# Patient Record
Sex: MALE | Race: WHITE | NOT HISPANIC OR LATINO | URBAN - METROPOLITAN AREA
[De-identification: names, ages, dates, MRNs, and addresses within clinical notes are randomized per-mention and may not be internally consistent; named-entity substitution may affect disease eponyms.]

---

## 2019-06-10 ENCOUNTER — EMERGENCY (EMERGENCY)
Facility: HOSPITAL | Age: 69
LOS: 0 days | Discharge: HOME | End: 2019-06-10
Attending: EMERGENCY MEDICINE | Admitting: EMERGENCY MEDICINE
Payer: MEDICARE

## 2019-06-10 VITALS
TEMPERATURE: 97 F | DIASTOLIC BLOOD PRESSURE: 60 MMHG | RESPIRATION RATE: 20 BRPM | HEART RATE: 60 BPM | OXYGEN SATURATION: 99 % | SYSTOLIC BLOOD PRESSURE: 133 MMHG
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
## 2019-06-10 DIAGNOSIS — S89.91XA UNSPECIFIED INJURY OF RIGHT LOWER LEG, INITIAL ENCOUNTER: ICD-10-CM
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
## 2019-06-10 DIAGNOSIS — M25.561 PAIN IN RIGHT KNEE: ICD-10-CM
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
## 2019-06-10 PROCEDURE — 99283 EMERGENCY DEPT VISIT LOW MDM: CPT

## 2019-06-10 PROCEDURE — 73562 X-RAY EXAM OF KNEE 3: CPT | Mod: 26,RT

## 2019-06-10 RX ORDER — IBUPROFEN 200 MG
600 TABLET ORAL ONCE
Refills: 0 | Status: COMPLETED | OUTPATIENT
Start: 2019-06-10 | End: 2019-06-10

## 2019-06-10 RX ORDER — IBUPROFEN 200 MG
1 TABLET ORAL
Qty: 28 | Refills: 0
Start: 2019-06-10 | End: 2019-06-16

## 2019-06-10 RX ORDER — IBUPROFEN 200 MG
400 TABLET ORAL ONCE
Refills: 0 | Status: DISCONTINUED | OUTPATIENT
Start: 2019-06-10 | End: 2019-06-10

## 2019-06-10 RX ADMIN — Medication 600 MILLIGRAM(S): at 14:52

## 2019-06-10 NOTE — ED PROVIDER NOTE - NS ED ROS FT
Eyes:  No visual changes, eye pain or discharge.  ENMT:  No hearing changes, pain, no sore throat or runny nose, no difficulty swallowing  Cardiac:  No chest pain, SOB or edema. No chest pain with exertion.  Respiratory:  No cough or respiratory distress. No hemoptysis. No history of asthma or RAD.  GI:  No nausea, vomiting, diarrhea or abdominal pain.  :  No dysuria, frequency or burning.  MS:  +Medial distal femur tenderness, Otherwise No myalgia, muscle weakness, joint pain or back pain.  Neuro:  No headache or weakness.  No LOC.  Skin:  No skin rash.   Endocrine: No history of thyroid disease or diabetes.

## 2019-06-10 NOTE — ED PROVIDER NOTE - OBJECTIVE STATEMENT
68 y.o. M with no significant PMH with right sided knee injury s/p mechanical slip and fall 3 hours ago. PT stepped over and fell on his buttock, he denies any weakness numbness tingling, no loss of consciousness, not on blood thinners, Pt able to walk at home without a walker.

## 2019-06-10 NOTE — ED PROVIDER NOTE - CARE PROVIDER_API CALL
Amado Galvez (MD)  Orthopaedic Surgery  [address] [place] [address]  Phone: [phone]  Fax: [phone]  Follow Up Time: 1-3 Days

## 2019-06-10 NOTE — ED PROVIDER NOTE - ATTENDING CONTRIBUTION TO CARE
Pt is a 69yo mle who jumpted down 3 steps and landed on his feet but felt a crack in his R knee.  Can walk but reports pain when trying to bend the knee.  No radiation of pain.    Exam: ambulatory, no hip or ankle tenderness, mild tenderness over top of knee, no effusion  Imp: r/o fx  Plan: xr, strapping - ortho f/u

## 2019-06-10 NOTE — ED ADULT NURSE NOTE - NSIMPLEMENTINTERV_GEN_ALL_ED
Implemented All Fall Risk Interventions:  [place] to call system. Call bell, personal items and telephone within reach. Instruct patient to call for assistance. Room bathroom lighting operational. Non-slip footwear when patient is off stretcher. Physically safe environment: no spills, clutter or unnecessary equipment. Stretcher in lowest position, wheels locked, appropriate side rails in place. Provide visual cue, wrist band, yellow gown, etc. Monitor gait and stability. Monitor for mental status changes and reorient to person, place, and time. Review medications for side effects contributing to fall risk. Reinforce activity limits and safety measures with patient and family.

## 2019-06-10 NOTE — ED PROVIDER NOTE - PHYSICAL EXAMINATION
CONSTITUTIONAL: Well-developed; well-nourished; in no acute distress.   SKIN: warm, dry  HEAD: Normocephalic; atraumatic.  EXT: +decreased ROM of right knee, + medial distal femur tenderness on right side, Otherwise Normal ROM.  No clubbing, cyanosis or edema.   NEURO: Alert, oriented, grossly unremarkable  PSYCH: Cooperative, appropriate.

## 2019-09-20 ENCOUNTER — APPOINTMENT (OUTPATIENT)
Dept: CARDIOLOGY | Facility: CLINIC | Age: 69
End: 2019-09-20
Payer: MEDICARE

## 2019-09-20 PROCEDURE — 99214 OFFICE O/P EST MOD 30 MIN: CPT

## 2019-09-20 PROCEDURE — 93000 ELECTROCARDIOGRAM COMPLETE: CPT

## 2019-12-24 ENCOUNTER — INPATIENT (INPATIENT)
Facility: HOSPITAL | Age: 69
LOS: 0 days | Discharge: HOME | End: 2019-12-25
Attending: INTERNAL MEDICINE | Admitting: INTERNAL MEDICINE
Payer: MEDICARE

## 2019-12-24 VITALS
HEART RATE: 90 BPM | DIASTOLIC BLOOD PRESSURE: 81 MMHG | OXYGEN SATURATION: 94 % | SYSTOLIC BLOOD PRESSURE: 197 MMHG | RESPIRATION RATE: 18 BRPM | TEMPERATURE: 102 F

## 2019-12-24 LAB
APTT BLD: 29.3 SEC — SIGNIFICANT CHANGE UP (ref 27–39.2)
BASE EXCESS BLDV CALC-SCNC: -1.6 MMOL/L — SIGNIFICANT CHANGE UP (ref -2–2)
BASOPHILS # BLD AUTO: 0.03 K/UL — SIGNIFICANT CHANGE UP (ref 0–0.2)
BASOPHILS NFR BLD AUTO: 0.2 % — SIGNIFICANT CHANGE UP (ref 0–1)
CA-I SERPL-SCNC: 1.21 MMOL/L — SIGNIFICANT CHANGE UP (ref 1.12–1.3)
EOSINOPHIL # BLD AUTO: 0.11 K/UL — SIGNIFICANT CHANGE UP (ref 0–0.7)
EOSINOPHIL NFR BLD AUTO: 0.9 % — SIGNIFICANT CHANGE UP (ref 0–8)
GAS PNL BLDV: 141 MMOL/L — SIGNIFICANT CHANGE UP (ref 136–145)
GAS PNL BLDV: SIGNIFICANT CHANGE UP
HCO3 BLDV-SCNC: 23 MMOL/L — SIGNIFICANT CHANGE UP (ref 22–29)
HCT VFR BLD CALC: 31.1 % — LOW (ref 42–52)
HCT VFR BLDA CALC: 32.5 % — LOW (ref 34–44)
HGB BLD CALC-MCNC: 10.6 G/DL — LOW (ref 14–18)
HGB BLD-MCNC: 10.3 G/DL — LOW (ref 14–18)
IMM GRANULOCYTES NFR BLD AUTO: 0.4 % — HIGH (ref 0.1–0.3)
INR BLD: 1.09 RATIO — SIGNIFICANT CHANGE UP (ref 0.65–1.3)
LACTATE BLDV-MCNC: 1 MMOL/L — SIGNIFICANT CHANGE UP (ref 0.5–1.6)
LYMPHOCYTES # BLD AUTO: 0.59 K/UL — LOW (ref 1.2–3.4)
LYMPHOCYTES # BLD AUTO: 4.6 % — LOW (ref 20.5–51.1)
MCHC RBC-ENTMCNC: 29.4 PG — SIGNIFICANT CHANGE UP (ref 27–31)
MCHC RBC-ENTMCNC: 33.1 G/DL — SIGNIFICANT CHANGE UP (ref 32–37)
MCV RBC AUTO: 88.9 FL — SIGNIFICANT CHANGE UP (ref 80–94)
MONOCYTES # BLD AUTO: 1.06 K/UL — HIGH (ref 0.1–0.6)
MONOCYTES NFR BLD AUTO: 8.2 % — SIGNIFICANT CHANGE UP (ref 1.7–9.3)
NEUTROPHILS # BLD AUTO: 11.04 K/UL — HIGH (ref 1.4–6.5)
NEUTROPHILS NFR BLD AUTO: 85.7 % — HIGH (ref 42.2–75.2)
NRBC # BLD: 0 /100 WBCS — SIGNIFICANT CHANGE UP (ref 0–0)
PCO2 BLDV: 39 MMHG — LOW (ref 41–51)
PH BLDV: 7.38 — SIGNIFICANT CHANGE UP (ref 7.26–7.43)
PLATELET # BLD AUTO: 216 K/UL — SIGNIFICANT CHANGE UP (ref 130–400)
PO2 BLDV: 30 MMHG — SIGNIFICANT CHANGE UP (ref 20–40)
POTASSIUM BLDV-SCNC: 4.8 MMOL/L — SIGNIFICANT CHANGE UP (ref 3.3–5.6)
PROTHROM AB SERPL-ACNC: 12.5 SEC — SIGNIFICANT CHANGE UP (ref 9.95–12.87)
RBC # BLD: 3.5 M/UL — LOW (ref 4.7–6.1)
RBC # FLD: 11.7 % — SIGNIFICANT CHANGE UP (ref 11.5–14.5)
SAO2 % BLDV: 57 % — SIGNIFICANT CHANGE UP
WBC # BLD: 12.88 K/UL — HIGH (ref 4.8–10.8)
WBC # FLD AUTO: 12.88 K/UL — HIGH (ref 4.8–10.8)

## 2019-12-24 PROCEDURE — 99053 MED SERV 10PM-8AM 24 HR FAC: CPT

## 2019-12-24 PROCEDURE — 71045 X-RAY EXAM CHEST 1 VIEW: CPT | Mod: 26

## 2019-12-24 PROCEDURE — 99285 EMERGENCY DEPT VISIT HI MDM: CPT

## 2019-12-24 PROCEDURE — 93010 ELECTROCARDIOGRAM REPORT: CPT

## 2019-12-24 RX ORDER — ONDANSETRON 8 MG/1
4 TABLET, FILM COATED ORAL ONCE
Refills: 0 | Status: COMPLETED | OUTPATIENT
Start: 2019-12-24 | End: 2019-12-24

## 2019-12-24 RX ORDER — ACETAMINOPHEN 500 MG
975 TABLET ORAL ONCE
Refills: 0 | Status: COMPLETED | OUTPATIENT
Start: 2019-12-24 | End: 2019-12-24

## 2019-12-24 RX ORDER — SODIUM CHLORIDE 9 MG/ML
1000 INJECTION INTRAMUSCULAR; INTRAVENOUS; SUBCUTANEOUS ONCE
Refills: 0 | Status: COMPLETED | OUTPATIENT
Start: 2019-12-24 | End: 2019-12-24

## 2019-12-24 RX ADMIN — SODIUM CHLORIDE 1000 MILLILITER(S): 9 INJECTION INTRAMUSCULAR; INTRAVENOUS; SUBCUTANEOUS at 23:19

## 2019-12-24 RX ADMIN — ONDANSETRON 4 MILLIGRAM(S): 8 TABLET, FILM COATED ORAL at 23:19

## 2019-12-24 RX ADMIN — SODIUM CHLORIDE 1000 MILLILITER(S): 9 INJECTION INTRAMUSCULAR; INTRAVENOUS; SUBCUTANEOUS at 23:49

## 2019-12-24 RX ADMIN — Medication 975 MILLIGRAM(S): at 23:19

## 2019-12-24 NOTE — ED ADULT NURSE NOTE - OBJECTIVE STATEMENT
Patient is a 68 yo male c/o nausea, vomiting and chills started this afternoon, denies abd pain, diarrhea, urinary symptoms, blood in stool.

## 2019-12-24 NOTE — ED ADULT NURSE NOTE - NSIMPLEMENTINTERV_GEN_ALL_ED
Implemented All Universal Safety Interventions:  [place] to call system. Call bell, personal items and telephone within reach. Instruct patient to call for assistance. Room bathroom lighting operational. Non-slip footwear when patient is off stretcher. Physically safe environment: no spills, clutter or unnecessary equipment. Stretcher in lowest position, wheels locked, appropriate side rails in place.

## 2019-12-24 NOTE — ED PROVIDER NOTE - CARE PLAN
Principal Discharge DX:	Fever  Secondary Diagnosis:	Enteritis  Secondary Diagnosis:	Anemia  Secondary Diagnosis:	LAURA (acute kidney injury)  Secondary Diagnosis:	CKD (chronic kidney disease)

## 2019-12-24 NOTE — ED PROVIDER NOTE - OBJECTIVE STATEMENT
Patient is BIB family for evaluation of fever, nausea, chills x one day, no travel, had sick contacts one week ago with family members who had flu like illness. Denies blood in the stool, denies diarrhea, denies nasal congestion, no cough, no rash, no HA/neck pain/back pain, no  symptoms.

## 2019-12-24 NOTE — ED PROVIDER NOTE - CLINICAL SUMMARY MEDICAL DECISION MAKING FREE TEXT BOX
Patient with fever, enteritis, leukocytosis, LAURA on CKD, IVF and IV abx given, is admitted to Medicine for further care.

## 2019-12-25 ENCOUNTER — TRANSCRIPTION ENCOUNTER (OUTPATIENT)
Age: 69
End: 2019-12-25

## 2019-12-25 VITALS
DIASTOLIC BLOOD PRESSURE: 72 MMHG | RESPIRATION RATE: 18 BRPM | SYSTOLIC BLOOD PRESSURE: 172 MMHG | HEART RATE: 63 BPM | OXYGEN SATURATION: 97 % | TEMPERATURE: 99 F

## 2019-12-25 DIAGNOSIS — Z90.49 ACQUIRED ABSENCE OF OTHER SPECIFIED PARTS OF DIGESTIVE TRACT: Chronic | ICD-10-CM

## 2019-12-25 DIAGNOSIS — Z95.1 PRESENCE OF AORTOCORONARY BYPASS GRAFT: Chronic | ICD-10-CM

## 2019-12-25 LAB
ALBUMIN SERPL ELPH-MCNC: 4.2 G/DL — SIGNIFICANT CHANGE UP (ref 3.5–5.2)
ALP SERPL-CCNC: 83 U/L — SIGNIFICANT CHANGE UP (ref 30–115)
ALT FLD-CCNC: 18 U/L — SIGNIFICANT CHANGE UP (ref 0–41)
ANION GAP SERPL CALC-SCNC: 14 MMOL/L — SIGNIFICANT CHANGE UP (ref 7–14)
ANION GAP SERPL CALC-SCNC: 16 MMOL/L — HIGH (ref 7–14)
APPEARANCE UR: CLEAR — SIGNIFICANT CHANGE UP
AST SERPL-CCNC: 24 U/L — SIGNIFICANT CHANGE UP (ref 0–41)
BACTERIA # UR AUTO: NEGATIVE — SIGNIFICANT CHANGE UP
BASOPHILS # BLD AUTO: 0.03 K/UL — SIGNIFICANT CHANGE UP (ref 0–0.2)
BASOPHILS NFR BLD AUTO: 0.3 % — SIGNIFICANT CHANGE UP (ref 0–1)
BILIRUB DIRECT SERPL-MCNC: <0.2 MG/DL — SIGNIFICANT CHANGE UP (ref 0–0.2)
BILIRUB INDIRECT FLD-MCNC: >0.1 MG/DL — LOW (ref 0.2–1.2)
BILIRUB SERPL-MCNC: 0.3 MG/DL — SIGNIFICANT CHANGE UP (ref 0.2–1.2)
BILIRUB UR-MCNC: NEGATIVE — SIGNIFICANT CHANGE UP
BUN SERPL-MCNC: 44 MG/DL — HIGH (ref 10–20)
BUN SERPL-MCNC: 44 MG/DL — HIGH (ref 10–20)
CALCIUM SERPL-MCNC: 9 MG/DL — SIGNIFICANT CHANGE UP (ref 8.5–10.1)
CALCIUM SERPL-MCNC: 9.2 MG/DL — SIGNIFICANT CHANGE UP (ref 8.5–10.1)
CHLORIDE SERPL-SCNC: 103 MMOL/L — SIGNIFICANT CHANGE UP (ref 98–110)
CHLORIDE SERPL-SCNC: 108 MMOL/L — SIGNIFICANT CHANGE UP (ref 98–110)
CO2 SERPL-SCNC: 19 MMOL/L — SIGNIFICANT CHANGE UP (ref 17–32)
CO2 SERPL-SCNC: 19 MMOL/L — SIGNIFICANT CHANGE UP (ref 17–32)
COLOR SPEC: SIGNIFICANT CHANGE UP
CREAT SERPL-MCNC: 1.8 MG/DL — HIGH (ref 0.7–1.5)
CREAT SERPL-MCNC: 1.9 MG/DL — HIGH (ref 0.7–1.5)
DIFF PNL FLD: SIGNIFICANT CHANGE UP
EOSINOPHIL # BLD AUTO: 0.03 K/UL — SIGNIFICANT CHANGE UP (ref 0–0.7)
EOSINOPHIL NFR BLD AUTO: 0.3 % — SIGNIFICANT CHANGE UP (ref 0–8)
EPI CELLS # UR: 1 /HPF — SIGNIFICANT CHANGE UP (ref 0–5)
ESTIMATED AVERAGE GLUCOSE: 183 MG/DL — HIGH (ref 68–114)
FLU A RESULT: NEGATIVE — SIGNIFICANT CHANGE UP
FLU A RESULT: NEGATIVE — SIGNIFICANT CHANGE UP
FLUAV AG NPH QL: NEGATIVE — SIGNIFICANT CHANGE UP
FLUBV AG NPH QL: NEGATIVE — SIGNIFICANT CHANGE UP
GLUCOSE BLDC GLUCOMTR-MCNC: 189 MG/DL — HIGH (ref 70–99)
GLUCOSE SERPL-MCNC: 212 MG/DL — HIGH (ref 70–99)
GLUCOSE SERPL-MCNC: 227 MG/DL — HIGH (ref 70–99)
GLUCOSE UR QL: NEGATIVE — SIGNIFICANT CHANGE UP
HBA1C BLD-MCNC: 8 % — HIGH (ref 4–5.6)
HCT VFR BLD CALC: 30.3 % — LOW (ref 42–52)
HGB BLD-MCNC: 9.5 G/DL — LOW (ref 14–18)
HYALINE CASTS # UR AUTO: 1 /LPF — SIGNIFICANT CHANGE UP (ref 0–7)
IMM GRANULOCYTES NFR BLD AUTO: 0.4 % — HIGH (ref 0.1–0.3)
KETONES UR-MCNC: NEGATIVE — SIGNIFICANT CHANGE UP
LACTATE SERPL-SCNC: 1.1 MMOL/L — SIGNIFICANT CHANGE UP (ref 0.7–2)
LEUKOCYTE ESTERASE UR-ACNC: NEGATIVE — SIGNIFICANT CHANGE UP
LIDOCAIN IGE QN: 43 U/L — SIGNIFICANT CHANGE UP (ref 7–60)
LYMPHOCYTES # BLD AUTO: 1.01 K/UL — LOW (ref 1.2–3.4)
LYMPHOCYTES # BLD AUTO: 9 % — LOW (ref 20.5–51.1)
MAGNESIUM SERPL-MCNC: 2.3 MG/DL — SIGNIFICANT CHANGE UP (ref 1.8–2.4)
MCHC RBC-ENTMCNC: 28.5 PG — SIGNIFICANT CHANGE UP (ref 27–31)
MCHC RBC-ENTMCNC: 31.4 G/DL — LOW (ref 32–37)
MCV RBC AUTO: 91 FL — SIGNIFICANT CHANGE UP (ref 80–94)
MONOCYTES # BLD AUTO: 0.94 K/UL — HIGH (ref 0.1–0.6)
MONOCYTES NFR BLD AUTO: 8.4 % — SIGNIFICANT CHANGE UP (ref 1.7–9.3)
NEUTROPHILS # BLD AUTO: 9.19 K/UL — HIGH (ref 1.4–6.5)
NEUTROPHILS NFR BLD AUTO: 81.6 % — HIGH (ref 42.2–75.2)
NITRITE UR-MCNC: NEGATIVE — SIGNIFICANT CHANGE UP
NRBC # BLD: 0 /100 WBCS — SIGNIFICANT CHANGE UP (ref 0–0)
PH UR: 6 — SIGNIFICANT CHANGE UP (ref 5–8)
PLATELET # BLD AUTO: 205 K/UL — SIGNIFICANT CHANGE UP (ref 130–400)
POTASSIUM SERPL-MCNC: 4.8 MMOL/L — SIGNIFICANT CHANGE UP (ref 3.5–5)
POTASSIUM SERPL-MCNC: 4.9 MMOL/L — SIGNIFICANT CHANGE UP (ref 3.5–5)
POTASSIUM SERPL-SCNC: 4.8 MMOL/L — SIGNIFICANT CHANGE UP (ref 3.5–5)
POTASSIUM SERPL-SCNC: 4.9 MMOL/L — SIGNIFICANT CHANGE UP (ref 3.5–5)
PROT SERPL-MCNC: 7.1 G/DL — SIGNIFICANT CHANGE UP (ref 6–8)
PROT UR-MCNC: ABNORMAL
RAPID RVP RESULT: SIGNIFICANT CHANGE UP
RBC # BLD: 3.33 M/UL — LOW (ref 4.7–6.1)
RBC # FLD: 11.8 % — SIGNIFICANT CHANGE UP (ref 11.5–14.5)
RBC CASTS # UR COMP ASSIST: 1 /HPF — SIGNIFICANT CHANGE UP (ref 0–4)
RSV RESULT: NEGATIVE — SIGNIFICANT CHANGE UP
RSV RNA RESP QL NAA+PROBE: NEGATIVE — SIGNIFICANT CHANGE UP
SODIUM SERPL-SCNC: 138 MMOL/L — SIGNIFICANT CHANGE UP (ref 135–146)
SODIUM SERPL-SCNC: 141 MMOL/L — SIGNIFICANT CHANGE UP (ref 135–146)
SP GR SPEC: 1.02 — SIGNIFICANT CHANGE UP (ref 1.01–1.02)
TROPONIN T SERPL-MCNC: <0.01 NG/ML — SIGNIFICANT CHANGE UP
UROBILINOGEN FLD QL: SIGNIFICANT CHANGE UP
WBC # BLD: 11.24 K/UL — HIGH (ref 4.8–10.8)
WBC # FLD AUTO: 11.24 K/UL — HIGH (ref 4.8–10.8)
WBC UR QL: 1 /HPF — SIGNIFICANT CHANGE UP (ref 0–5)

## 2019-12-25 PROCEDURE — 74176 CT ABD & PELVIS W/O CONTRAST: CPT | Mod: 26

## 2019-12-25 PROCEDURE — 99223 1ST HOSP IP/OBS HIGH 75: CPT

## 2019-12-25 RX ORDER — ONDANSETRON 8 MG/1
4 TABLET, FILM COATED ORAL EVERY 8 HOURS
Refills: 0 | Status: DISCONTINUED | OUTPATIENT
Start: 2019-12-25 | End: 2019-12-25

## 2019-12-25 RX ORDER — DEXTROSE 50 % IN WATER 50 %
25 SYRINGE (ML) INTRAVENOUS ONCE
Refills: 0 | Status: DISCONTINUED | OUTPATIENT
Start: 2019-12-25 | End: 2019-12-25

## 2019-12-25 RX ORDER — DEXTROSE 50 % IN WATER 50 %
12.5 SYRINGE (ML) INTRAVENOUS ONCE
Refills: 0 | Status: DISCONTINUED | OUTPATIENT
Start: 2019-12-25 | End: 2019-12-25

## 2019-12-25 RX ORDER — SIMVASTATIN 20 MG/1
40 TABLET, FILM COATED ORAL AT BEDTIME
Refills: 0 | Status: DISCONTINUED | OUTPATIENT
Start: 2019-12-25 | End: 2019-12-25

## 2019-12-25 RX ORDER — ASPIRIN/CALCIUM CARB/MAGNESIUM 324 MG
81 TABLET ORAL DAILY
Refills: 0 | Status: DISCONTINUED | OUTPATIENT
Start: 2019-12-25 | End: 2019-12-25

## 2019-12-25 RX ORDER — INSULIN LISPRO 100/ML
VIAL (ML) SUBCUTANEOUS
Refills: 0 | Status: DISCONTINUED | OUTPATIENT
Start: 2019-12-25 | End: 2019-12-25

## 2019-12-25 RX ORDER — CEFTRIAXONE 500 MG/1
1000 INJECTION, POWDER, FOR SOLUTION INTRAMUSCULAR; INTRAVENOUS ONCE
Refills: 0 | Status: COMPLETED | OUTPATIENT
Start: 2019-12-25 | End: 2019-12-25

## 2019-12-25 RX ORDER — PANTOPRAZOLE SODIUM 20 MG/1
40 TABLET, DELAYED RELEASE ORAL ONCE
Refills: 0 | Status: COMPLETED | OUTPATIENT
Start: 2019-12-25 | End: 2019-12-25

## 2019-12-25 RX ORDER — CHLORHEXIDINE GLUCONATE 213 G/1000ML
1 SOLUTION TOPICAL
Refills: 0 | Status: DISCONTINUED | OUTPATIENT
Start: 2019-12-25 | End: 2019-12-25

## 2019-12-25 RX ORDER — GLUCAGON INJECTION, SOLUTION 0.5 MG/.1ML
1 INJECTION, SOLUTION SUBCUTANEOUS ONCE
Refills: 0 | Status: DISCONTINUED | OUTPATIENT
Start: 2019-12-25 | End: 2019-12-25

## 2019-12-25 RX ORDER — INSULIN GLARGINE 100 [IU]/ML
28 INJECTION, SOLUTION SUBCUTANEOUS EVERY MORNING
Refills: 0 | Status: DISCONTINUED | OUTPATIENT
Start: 2019-12-25 | End: 2019-12-25

## 2019-12-25 RX ORDER — METOPROLOL TARTRATE 50 MG
100 TABLET ORAL DAILY
Refills: 0 | Status: DISCONTINUED | OUTPATIENT
Start: 2019-12-25 | End: 2019-12-25

## 2019-12-25 RX ORDER — PANTOPRAZOLE SODIUM 20 MG/1
40 TABLET, DELAYED RELEASE ORAL
Refills: 0 | Status: DISCONTINUED | OUTPATIENT
Start: 2019-12-26 | End: 2019-12-25

## 2019-12-25 RX ORDER — HEPARIN SODIUM 5000 [USP'U]/ML
5000 INJECTION INTRAVENOUS; SUBCUTANEOUS EVERY 8 HOURS
Refills: 0 | Status: DISCONTINUED | OUTPATIENT
Start: 2019-12-25 | End: 2019-12-25

## 2019-12-25 RX ORDER — INFLUENZA VIRUS VACCINE 15; 15; 15; 15 UG/.5ML; UG/.5ML; UG/.5ML; UG/.5ML
0.5 SUSPENSION INTRAMUSCULAR ONCE
Refills: 0 | Status: DISCONTINUED | OUTPATIENT
Start: 2019-12-25 | End: 2019-12-25

## 2019-12-25 RX ORDER — DEXTROSE 50 % IN WATER 50 %
15 SYRINGE (ML) INTRAVENOUS ONCE
Refills: 0 | Status: DISCONTINUED | OUTPATIENT
Start: 2019-12-25 | End: 2019-12-25

## 2019-12-25 RX ORDER — LISINOPRIL 2.5 MG/1
20 TABLET ORAL DAILY
Refills: 0 | Status: DISCONTINUED | OUTPATIENT
Start: 2019-12-25 | End: 2019-12-25

## 2019-12-25 RX ORDER — SODIUM CHLORIDE 9 MG/ML
1000 INJECTION, SOLUTION INTRAVENOUS
Refills: 0 | Status: DISCONTINUED | OUTPATIENT
Start: 2019-12-25 | End: 2019-12-25

## 2019-12-25 RX ADMIN — PANTOPRAZOLE SODIUM 40 MILLIGRAM(S): 20 TABLET, DELAYED RELEASE ORAL at 05:33

## 2019-12-25 RX ADMIN — CEFTRIAXONE 1000 MILLIGRAM(S): 500 INJECTION, POWDER, FOR SOLUTION INTRAMUSCULAR; INTRAVENOUS at 01:53

## 2019-12-25 RX ADMIN — Medication 100 MILLIGRAM(S): at 05:33

## 2019-12-25 RX ADMIN — CEFTRIAXONE 100 MILLIGRAM(S): 500 INJECTION, POWDER, FOR SOLUTION INTRAMUSCULAR; INTRAVENOUS at 01:13

## 2019-12-25 RX ADMIN — ONDANSETRON 4 MILLIGRAM(S): 8 TABLET, FILM COATED ORAL at 05:33

## 2019-12-25 RX ADMIN — INSULIN GLARGINE 28 UNIT(S): 100 INJECTION, SOLUTION SUBCUTANEOUS at 08:26

## 2019-12-25 RX ADMIN — LISINOPRIL 20 MILLIGRAM(S): 2.5 TABLET ORAL at 05:33

## 2019-12-25 RX ADMIN — Medication 1: at 08:25

## 2019-12-25 NOTE — DISCHARGE NOTE PROVIDER - HOSPITAL COURSE
69 year old male with PMH of HTN, DM II, DLD, CAD s/p CABG (2001) presents to the ED complaining of chills, nausea, vomiting and body aches. Patient reports that today he had some chills with body aches, he later went to his daughter's house and was eating seafood when at around 6 PM he felt nauseated, dizzy and started vomiting, non-bloody and nonbilious. Patient denies diarrhea, shortness of breath, chest pain        today he is feeling better. no n/v, no diarrhea, no fever. is able to tolerated food.     son had similar symptoms but less severe and resolved spontanously.

## 2019-12-25 NOTE — DISCHARGE NOTE PROVIDER - NSDCMRMEDTOKEN_GEN_ALL_CORE_FT
lisinopril 20 mg oral tablet: 1 tab(s) orally once a day  metoprolol succinate 100 mg oral tablet, extended release: 1 tab(s) orally once a day  NovoLOG FlexPen 100 units/mL injectable solution: 5 unit(s) injectable 2 times a day (with meals), As Needed  simvastatin 40 mg oral tablet: 1 tab(s) orally once a day (at bedtime)  Tresiba 100 units/mL subcutaneous solution: 28 unit(s) subcutaneous once a day (at bedtime)

## 2019-12-25 NOTE — DISCHARGE NOTE PROVIDER - NSDCCPCAREPLAN_GEN_ALL_CORE_FT
PRINCIPAL DISCHARGE DIAGNOSIS  Diagnosis: Fever  Assessment and Plan of Treatment:       SECONDARY DISCHARGE DIAGNOSES  Diagnosis: CKD (chronic kidney disease)  Assessment and Plan of Treatment:     Diagnosis: Anemia  Assessment and Plan of Treatment:     Diagnosis: Enteritis  Assessment and Plan of Treatment: PRINCIPAL DISCHARGE DIAGNOSIS  Diagnosis: Fever  Assessment and Plan of Treatment: if fever returns pls return to er      SECONDARY DISCHARGE DIAGNOSES  Diagnosis: CKD (chronic kidney disease)  Assessment and Plan of Treatment: follow up with pmd    Diagnosis: Anemia  Assessment and Plan of Treatment: need to see GI doctor for workup

## 2019-12-25 NOTE — H&P ADULT - HISTORY OF PRESENT ILLNESS
69 year old male with PMH of HTN, DM II, DLD, CAD s/p CABG (2001) presents to the ED complaining of chills, nausea, vomiting and body aches. Patient reports that today he had some chills with body aches, he later went to his daughter's house and was eating seafood when at around 6 PM he felt nauseated, dizzy and started vomiting, non-bloody and nonbilious. Patient denies diarrhea, shortness of breath, chest pain    In ED: febrile Tmax 102.4 F. WBC 12.8. Lactate WNL. Troponin negative. Cr 1.8  CT A/P with findings consistent with enteritis  Administered 1 liter NS IV bolus, Rocephin 1 gm IV once, Zofran

## 2019-12-25 NOTE — H&P ADULT - ASSESSMENT
69 year old male with PMH of HTN, DM II, DLD, CAD s/p CABG (2001) presents to the ED complaining of chills, nausea, vomiting and body aches    # Gastroenteritis likely viral  - Febrile Tmax 102.4 F. WBC 12.8. Lactate WNL. Troponin negative.   - CT A/P prelim: findings consistent with enteritis  - Symptoms more consistent with viral etiology (myalgias, nausea vomiting    # LAURA vs CKD  - Cr 1.8 (unknown baseline)  - s/p 1 liter NS IV bolus  - Repeat BMP    # DM II    # HTN    # DLD    # CAD s/p CABG    GI ppx: Protonix  DVT ppx: Heparin SC    Dispo: from home 69 year old male with PMH of HTN, DM II, DLD, CAD s/p CABG (2001) presents to the ED complaining of chills, nausea, vomiting and body aches    # Gastroenteritis likely viral  - Febrile Tmax 102.4 F. WBC 12.8. Lactate WNL. Troponin negative.   - CT A/P prelim: findings consistent with enteritis  - Symptoms more consistent with viral etiology (myalgias, nausea, vomiting, no diarrhea, fever 102.4)  - s/p Rocephin 1 gm IV once in ED  - Monitor off antibiotics  - Nausea control with Zofran    # LAURA vs CKD  - Cr 1.8 (unknown baseline)  - s/p 1 liter NS IV bolus  - Repeat BMP    # DM II  - On Tresiba 28 units qHS and Novolog PRN at home  - Fingerstick glucose  - Insulin Glargine 28 units qHS and Lispro SS    # HTN - Continue Toprol and Lisinopril    # DLD - Continue Simvastatin    # CAD s/p CABG - Continue ASA    GI ppx: Protonix  DVT ppx: Heparin SC    Dispo: from home

## 2019-12-25 NOTE — DISCHARGE NOTE PROVIDER - CARE PROVIDER_API CALL
Santy Hurt)  Cardiovascular Disease; Internal Medicine; Interventional Cardiology  [address] [place] [address], [address]  [place] [address]  Phone: [phone]  Fax: [phone]  Follow Up Time:

## 2019-12-25 NOTE — DISCHARGE NOTE NURSING/CASE MANAGEMENT/SOCIAL WORK - PATIENT PORTAL LINK FT
You can access the FollowMyHealth Patient Portal offered by [facility] by registering at the following website: [email or web address]. By joining [employer]’s FollowMyHealth portal, you will also be able to view your health information using other applications (apps) compatible with our system.

## 2019-12-25 NOTE — H&P ADULT - NSHPLABSRESULTS_GEN_ALL_CORE
10.3    )-----------( 216      ( 24 Dec 2019 23:04 )             31.1           138  |  103  |  44<H>  ----------------------------<  212<H>  4.9   |  19  |  1.8<H>    Ca    9.2      24 Dec 2019 23:04    TPro  7.1  /  Alb  4.2  /  TBili  0.3  /  DBili  <0.2  /  AST  24  /  ALT  18  /  AlkPhos  83                Urinalysis Basic - ( 25 Dec 2019 00:00 )    Color: Light Yellow / Appearance: Clear / S.018 / pH: x  Gluc: x / Ketone: Negative  / Bili: Negative / Urobili: <2 mg/dL   Blood: x / Protein: 30 mg/dL / Nitrite: Negative   Leuk Esterase: Negative / RBC: 1 /HPF / WBC 1 /HPF   Sq Epi: x / Non Sq Epi: 1 /HPF / Bacteria: Negative      PT/INR - ( 24 Dec 2019 23:04 )   PT: 12.50 sec;   INR: 1.09 ratio         PTT - ( 24 Dec 2019 23:04 )  PTT:29.3 sec    Lactate Trend   @ 23:04 Lactate:1.1       CARDIAC MARKERS ( 24 Dec 2019 23:04 )  x     / <0.01 ng/mL / x     / x     / x          CAPILLARY BLOOD GLUCOSE  POCT Blood Glucose.: 190 mg/dL (24 Dec 2019 22:39)    -------    < from: CT Abdomen and Pelvis No Cont (19 @ 01:48) >    IMPRESSION:     Mesenteric stranding surrounding the proximal duodenum with circumferential thickening of the duodenal wall may represent a component of enteritis.    < end of copied text >

## 2019-12-25 NOTE — H&P ADULT - NSICDXPASTMEDICALHX_GEN_ALL_CORE_FT
PAST MEDICAL HISTORY:  CAD (coronary artery disease)     HTN (hypertension)     Hyperlipidemia     Type 2 diabetes mellitus

## 2019-12-25 NOTE — DISCHARGE NOTE PROVIDER - NSDCFUSCHEDAPPT_GEN_ALL_CORE_FT
RAMIN PINTO ; 01/17/2020 ; [facility] [address] RAMIN PINTO ; 01/17/2020 ; [facility] [address]

## 2019-12-25 NOTE — H&P ADULT - ATTENDING COMMENTS
Patient seen and examined independently. Agree with resident note/ history / physical exam and plan of care with following exceptions/additions/updates. Case discussed with house-staff, nursing and patient/pt decision maker.     pt is feeling better, no n/v, no diarrhea, no pain    Vital Signs Last 24 Hrs  T(C): 37 (25 Dec 2019 07:36), Max: 39.1 (24 Dec 2019 22:37)  T(F): 98.6 (25 Dec 2019 07:36), Max: 102.4 (24 Dec 2019 22:37)  HR: 63 (25 Dec 2019 07:36) (63 - 90)  BP: 172/72 (25 Dec 2019 07:36) (148/67 - 197/81)  RR: 18 (25 Dec 2019 07:36) (18 - 18)  SpO2: 97% (25 Dec 2019 07:36) (94% - 97%)    Physical exam:   constitutional NAD, AAOX3, Respiratory  lungs CTA, CVS heart RRR, GI: abdomen Soft NT, ND, BS+, skin: intact  neuro exam non focal.                           9.5    11.24 )-----------( 205      ( 25 Dec 2019 05:55 )             30.3   12-25    141  |  108  |  44<H>  ----------------------------<  227<H>  4.8   |  19  |  1.9<H>    Ca    9.0      25 Dec 2019 05:55  Mg     2.3     12-25    TPro  7.1  /  Alb  4.2  /  TBili  0.3  /  DBili  <0.2  /  AST  24  /  ALT  18  /  AlkPhos  83  12-24    a/p  # n.v, probably viral gastritis. resolved,   # abnl kidney function, chronic, ( compared to labs in 2013, cr was 1.78) outpt fu   #anemia no active bleeding, needs workup ( can be done as outpt since he has no active bleeding and hemodynamically stable )     # PAST MEDICAL & SURGICAL HISTORY: cont meds  Hyperlipidemia  HTN (hypertension)  Type 2 diabetes mellitus  CAD (coronary artery disease)  History of appendectomy  S/P CABG x 3: 2001    #Progress Note Handoff  Pending (specify):  -   Family discussion: emily pt , he wants to go home  Disposition: Home_ if tolerates diet

## 2019-12-25 NOTE — H&P ADULT - NSHPPHYSICALEXAM_GEN_ALL_CORE
Vital Signs Last 24 Hrs  T(C): 37.2 (25 Dec 2019 01:00), Max: 39.1 (24 Dec 2019 22:37)  T(F): 99 (25 Dec 2019 01:00), Max: 102.4 (24 Dec 2019 22:37)  HR: 86 (25 Dec 2019 01:00) (86 - 90)  BP: 148/67 (25 Dec 2019 01:00) (148/67 - 197/81)  RR: 18 (25 Dec 2019 01:00) (18 - 18)  SpO2: 95% (25 Dec 2019 01:00) (94% - 95%)    -------    PHYSICAL EXAM:  GENERAL: NAD, speaks in full sentences, no signs of respiratory distress  HEAD:  Atraumatic, Normocephalic  EYES: EOMI, PERRLA, anicteric sclera  NECK: Supple, No JVD  CHEST/LUNG: Clear to auscultation bilaterally; No wheeze; No crackles; No accessory muscles used  HEART: Regular rate and rhythm; systolic murmur  ABDOMEN: Soft, Nontender, Nondistended; Bowel sounds present; No guarding  EXTREMITIES:  2+ Peripheral Pulses, No cyanosis or edema  PSYCH: AAOx3  NEUROLOGY: non-focal  SKIN: No rashes or lesions

## 2019-12-26 LAB
-  STREPTOCOCCUS SP. (NOT GRP A, B OR S PNEUMONIAE): SIGNIFICANT CHANGE UP
CULTURE RESULTS: NO GROWTH — SIGNIFICANT CHANGE UP
CULTURE RESULTS: SIGNIFICANT CHANGE UP
GRAM STN FLD: SIGNIFICANT CHANGE UP
GRAM STN FLD: SIGNIFICANT CHANGE UP
METHOD TYPE: SIGNIFICANT CHANGE UP
ORGANISM # SPEC MICROSCOPIC CNT: SIGNIFICANT CHANGE UP
ORGANISM # SPEC MICROSCOPIC CNT: SIGNIFICANT CHANGE UP
SPECIMEN SOURCE: SIGNIFICANT CHANGE UP

## 2019-12-26 NOTE — CHART NOTE - NSCHARTNOTEFT_GEN_A_CORE
the blood cultures drawn when pt was admitted, are showing Gram pos cocci. left msg for pt and discussed with his daughter ( lourdes) , pt is feeling very well and has no fever. will follow up the final identification of the culture results. dw ID> probably skin contamination. pt will call me for final results tomorrow.   pt was advised to return to eR if he has fever or malaise. will fu cultres and inform him if it is not contamination to return to eR.     Culture - Blood (12.24.19 @ 23:04)    -  Streptococcus sp. (Not Grp A, B or S pneumoniae): Detec    Gram Stain:   Growth in anaerobic bottle: Gram Positive Cocci in Pairs and Chains    Specimen Source: .Blood Blood-Venous    Organism: Blood Culture PCR    Culture Results:   Growth in anaerobic bottle: Gram Positive Cocci in Pairs and Chains  "Due to technical problems, Proteus sp. will Not be reported as part of  the BCID panel until further notice"  ***Blood Panel PCR results on this specimen are available  approximately 3 hours after the Gram stain result.***  Gram stain, PCR, and/or culture results may not always  correspond due to difference in methodologies.  ************************************************************  This PCR assay was performed using [email or web address].  The following targets are tested for: Enterococcus,  vancomycin resistant enterococci, Listeria monocytogenes,  coagulase negative staphylococci, S. aureus,  methicillin resistant S. aureus, Streptococcus agalactiae  (Group B), S. pneumoniae, S. pyogenes (Group A),  Acinetobacter baumannii, Enterobacter cloacae, E. coli,  Klebsiella oxytoca, K. pneumoniae, Proteus sp.,  Serratia marcescens, Haemophilus influenzae,  Neisseria meningitidis, Pseudomonas aeruginosa, Candida  albicans, C. glabrata, C krusei, C parapsilosis,  C. tropicalis and the KPC resistance gene.    Organism Identification: Blood Culture PCR    Method Type: PCR

## 2019-12-27 ENCOUNTER — INPATIENT (INPATIENT)
Facility: HOSPITAL | Age: 69
LOS: 3 days | Discharge: HOME | End: 2019-12-31
Attending: HOSPITALIST | Admitting: HOSPITALIST
Payer: MEDICARE

## 2019-12-27 VITALS
RESPIRATION RATE: 16 BRPM | SYSTOLIC BLOOD PRESSURE: 183 MMHG | HEART RATE: 61 BPM | TEMPERATURE: 98 F | OXYGEN SATURATION: 96 % | DIASTOLIC BLOOD PRESSURE: 75 MMHG

## 2019-12-27 DIAGNOSIS — Z90.49 ACQUIRED ABSENCE OF OTHER SPECIFIED PARTS OF DIGESTIVE TRACT: Chronic | ICD-10-CM

## 2019-12-27 DIAGNOSIS — Z95.1 PRESENCE OF AORTOCORONARY BYPASS GRAFT: Chronic | ICD-10-CM

## 2019-12-27 PROBLEM — I10 ESSENTIAL (PRIMARY) HYPERTENSION: Chronic | Status: ACTIVE | Noted: 2019-12-25

## 2019-12-27 PROBLEM — E78.5 HYPERLIPIDEMIA, UNSPECIFIED: Chronic | Status: ACTIVE | Noted: 2019-12-25

## 2019-12-27 PROBLEM — I25.10 ATHEROSCLEROTIC HEART DISEASE OF NATIVE CORONARY ARTERY WITHOUT ANGINA PECTORIS: Chronic | Status: ACTIVE | Noted: 2019-12-25

## 2019-12-27 PROBLEM — E11.9 TYPE 2 DIABETES MELLITUS WITHOUT COMPLICATIONS: Chronic | Status: ACTIVE | Noted: 2019-12-25

## 2019-12-27 LAB
ALBUMIN SERPL ELPH-MCNC: 5 G/DL — SIGNIFICANT CHANGE UP (ref 3.5–5.2)
ALP SERPL-CCNC: 87 U/L — SIGNIFICANT CHANGE UP (ref 30–115)
ALT FLD-CCNC: 23 U/L — SIGNIFICANT CHANGE UP (ref 0–41)
ANION GAP SERPL CALC-SCNC: 19 MMOL/L — HIGH (ref 7–14)
AST SERPL-CCNC: 25 U/L — SIGNIFICANT CHANGE UP (ref 0–41)
BILIRUB SERPL-MCNC: 0.5 MG/DL — SIGNIFICANT CHANGE UP (ref 0.2–1.2)
BUN SERPL-MCNC: 37 MG/DL — HIGH (ref 10–20)
CALCIUM SERPL-MCNC: 10.4 MG/DL — HIGH (ref 8.5–10.1)
CHLORIDE SERPL-SCNC: 100 MMOL/L — SIGNIFICANT CHANGE UP (ref 98–110)
CO2 SERPL-SCNC: 22 MMOL/L — SIGNIFICANT CHANGE UP (ref 17–32)
CREAT SERPL-MCNC: 1.9 MG/DL — HIGH (ref 0.7–1.5)
ERYTHROCYTE [SEDIMENTATION RATE] IN BLOOD: 61 MM/HR — HIGH (ref 0–10)
GLUCOSE BLDC GLUCOMTR-MCNC: 184 MG/DL — HIGH (ref 70–99)
GLUCOSE BLDC GLUCOMTR-MCNC: 98 MG/DL — SIGNIFICANT CHANGE UP (ref 70–99)
GLUCOSE SERPL-MCNC: 131 MG/DL — HIGH (ref 70–99)
HCT VFR BLD CALC: 36.9 % — LOW (ref 42–52)
HGB BLD-MCNC: 11.8 G/DL — LOW (ref 14–18)
MCHC RBC-ENTMCNC: 29 PG — SIGNIFICANT CHANGE UP (ref 27–31)
MCHC RBC-ENTMCNC: 32 G/DL — SIGNIFICANT CHANGE UP (ref 32–37)
MCV RBC AUTO: 90.7 FL — SIGNIFICANT CHANGE UP (ref 80–94)
NRBC # BLD: 0 /100 WBCS — SIGNIFICANT CHANGE UP (ref 0–0)
PLATELET # BLD AUTO: 250 K/UL — SIGNIFICANT CHANGE UP (ref 130–400)
POTASSIUM SERPL-MCNC: 4.6 MMOL/L — SIGNIFICANT CHANGE UP (ref 3.5–5)
POTASSIUM SERPL-SCNC: 4.6 MMOL/L — SIGNIFICANT CHANGE UP (ref 3.5–5)
PROT SERPL-MCNC: 8.5 G/DL — HIGH (ref 6–8)
RBC # BLD: 4.07 M/UL — LOW (ref 4.7–6.1)
RBC # FLD: 11.8 % — SIGNIFICANT CHANGE UP (ref 11.5–14.5)
SODIUM SERPL-SCNC: 141 MMOL/L — SIGNIFICANT CHANGE UP (ref 135–146)
WBC # BLD: 9.15 K/UL — SIGNIFICANT CHANGE UP (ref 4.8–10.8)
WBC # FLD AUTO: 9.15 K/UL — SIGNIFICANT CHANGE UP (ref 4.8–10.8)

## 2019-12-27 PROCEDURE — 99285 EMERGENCY DEPT VISIT HI MDM: CPT

## 2019-12-27 PROCEDURE — 99223 1ST HOSP IP/OBS HIGH 75: CPT

## 2019-12-27 PROCEDURE — 93306 TTE W/DOPPLER COMPLETE: CPT | Mod: 26

## 2019-12-27 RX ORDER — SODIUM CHLORIDE 9 MG/ML
1000 INJECTION, SOLUTION INTRAVENOUS
Refills: 0 | Status: DISCONTINUED | OUTPATIENT
Start: 2019-12-27 | End: 2019-12-31

## 2019-12-27 RX ORDER — SIMVASTATIN 20 MG/1
40 TABLET, FILM COATED ORAL AT BEDTIME
Refills: 0 | Status: DISCONTINUED | OUTPATIENT
Start: 2019-12-27 | End: 2019-12-31

## 2019-12-27 RX ORDER — INFLUENZA VIRUS VACCINE 15; 15; 15; 15 UG/.5ML; UG/.5ML; UG/.5ML; UG/.5ML
0.5 SUSPENSION INTRAMUSCULAR ONCE
Refills: 0 | Status: COMPLETED | OUTPATIENT
Start: 2019-12-27 | End: 2019-12-27

## 2019-12-27 RX ORDER — HEPARIN SODIUM 5000 [USP'U]/ML
5000 INJECTION INTRAVENOUS; SUBCUTANEOUS EVERY 12 HOURS
Refills: 0 | Status: DISCONTINUED | OUTPATIENT
Start: 2019-12-27 | End: 2019-12-31

## 2019-12-27 RX ORDER — GLUCAGON INJECTION, SOLUTION 0.5 MG/.1ML
1 INJECTION, SOLUTION SUBCUTANEOUS ONCE
Refills: 0 | Status: DISCONTINUED | OUTPATIENT
Start: 2019-12-27 | End: 2019-12-31

## 2019-12-27 RX ORDER — CEFTRIAXONE 500 MG/1
2000 INJECTION, POWDER, FOR SOLUTION INTRAMUSCULAR; INTRAVENOUS EVERY 24 HOURS
Refills: 0 | Status: COMPLETED | OUTPATIENT
Start: 2019-12-27 | End: 2019-12-30

## 2019-12-27 RX ORDER — CHLORHEXIDINE GLUCONATE 213 G/1000ML
1 SOLUTION TOPICAL
Refills: 0 | Status: DISCONTINUED | OUTPATIENT
Start: 2019-12-27 | End: 2019-12-31

## 2019-12-27 RX ORDER — ASPIRIN/CALCIUM CARB/MAGNESIUM 324 MG
81 TABLET ORAL DAILY
Refills: 0 | Status: DISCONTINUED | OUTPATIENT
Start: 2019-12-27 | End: 2019-12-31

## 2019-12-27 RX ORDER — INSULIN LISPRO 100/ML
7 VIAL (ML) SUBCUTANEOUS
Refills: 0 | Status: DISCONTINUED | OUTPATIENT
Start: 2019-12-27 | End: 2019-12-27

## 2019-12-27 RX ORDER — DEXTROSE 50 % IN WATER 50 %
25 SYRINGE (ML) INTRAVENOUS ONCE
Refills: 0 | Status: DISCONTINUED | OUTPATIENT
Start: 2019-12-27 | End: 2019-12-31

## 2019-12-27 RX ORDER — DEXTROSE 50 % IN WATER 50 %
15 SYRINGE (ML) INTRAVENOUS ONCE
Refills: 0 | Status: DISCONTINUED | OUTPATIENT
Start: 2019-12-27 | End: 2019-12-31

## 2019-12-27 RX ORDER — CEFTRIAXONE 500 MG/1
2000 INJECTION, POWDER, FOR SOLUTION INTRAMUSCULAR; INTRAVENOUS ONCE
Refills: 0 | Status: DISCONTINUED | OUTPATIENT
Start: 2019-12-27 | End: 2019-12-27

## 2019-12-27 RX ORDER — METOPROLOL TARTRATE 50 MG
100 TABLET ORAL DAILY
Refills: 0 | Status: DISCONTINUED | OUTPATIENT
Start: 2019-12-27 | End: 2019-12-31

## 2019-12-27 RX ORDER — INSULIN GLARGINE 100 [IU]/ML
28 INJECTION, SOLUTION SUBCUTANEOUS AT BEDTIME
Refills: 0 | Status: DISCONTINUED | OUTPATIENT
Start: 2019-12-27 | End: 2019-12-31

## 2019-12-27 RX ORDER — LISINOPRIL 2.5 MG/1
20 TABLET ORAL DAILY
Refills: 0 | Status: DISCONTINUED | OUTPATIENT
Start: 2019-12-27 | End: 2019-12-31

## 2019-12-27 RX ORDER — DEXTROSE 50 % IN WATER 50 %
12.5 SYRINGE (ML) INTRAVENOUS ONCE
Refills: 0 | Status: DISCONTINUED | OUTPATIENT
Start: 2019-12-27 | End: 2019-12-31

## 2019-12-27 RX ADMIN — CEFTRIAXONE 100 MILLIGRAM(S): 500 INJECTION, POWDER, FOR SOLUTION INTRAMUSCULAR; INTRAVENOUS at 20:03

## 2019-12-27 RX ADMIN — SIMVASTATIN 40 MILLIGRAM(S): 20 TABLET, FILM COATED ORAL at 21:08

## 2019-12-27 RX ADMIN — INSULIN GLARGINE 28 UNIT(S): 100 INJECTION, SOLUTION SUBCUTANEOUS at 22:06

## 2019-12-27 RX ADMIN — Medication 100 MILLIGRAM(S): at 21:09

## 2019-12-27 RX ADMIN — Medication 7 UNIT(S): at 17:42

## 2019-12-27 RX ADMIN — LISINOPRIL 20 MILLIGRAM(S): 2.5 TABLET ORAL at 21:08

## 2019-12-27 NOTE — H&P ADULT - NSHPPHYSICALEXAM_GEN_ALL_CORE
GENERAL: NAD, alert oriented X3  HEENT: No lymphadenopathy  CHEST: clear to auscultate bilaterally  HEART: Normal S1 and S2  ABDOMEN: soft nontender  EXTREMITIES: No edema, small petechiae on the skin- (as per the patient comes and goes likely secondary to aspirin use)

## 2019-12-27 NOTE — CHART NOTE - NSCHARTNOTEFT_GEN_A_CORE
Culture - Blood (12.24.19 @ 23:04)    Gram Stain:   Growth in anaerobic bottle: Gram Positive Cocci in Pairs and Chains    -  Streptococcus sp. (Not Grp A, B or S pneumoniae): Detec    Specimen Source: .Blood Blood-Venous    Organism: Blood Culture PCR    Culture Results:   Growth in anaerobic bottle: Streptococcus anginosus "Susceptibilities not  performed"  "Due to technical problems, Proteus sp. will Not be reported as part of  the BCID panel until further notice"  ***Blood Panel PCR results on this specimen are available  approximately 3 hours after the Gram stain result.***  Gram stain, PCR, and/or culture results may not always  correspond due to difference in methodologies.  ************************************************************  This PCR assay was performed using [employer].  The following targets are tested for: Enterococcus,  vancomycin resistant enterococci, Listeria monocytogenes,  coagulase negative staphylococci, S. aureus,  methicillin resistant S. aureus, Streptococcus agalactiae  (Group B), S. pneumoniae, S. pyogenes (Group A),  Acinetobacter baumannii, Enterobacter cloacae, E. coli,  Klebsiella oxytoca, K. pneumoniae, Proteus sp.,  Serratia marcescens, Haemophilus influenzae,  Neisseria meningitidis, Pseudomonas aeruginosa, Candida  albicans, C. glabrata, C krusei, C parapsilosis,  C. tropicalis and the KPC resistance gene.    Organism Identification: Blood Culture PCR    Method Type: PCR    dw ID: pt needs IV abx,  I called the pt and recommended him to come back to the hospital for IVabx  also will do an echo.   pt is going to come back today. Culture - Blood (12.24.19 @ 23:04)    Gram Stain:   Growth in anaerobic bottle: Gram Positive Cocci in Pairs and Chains    -  Streptococcus sp. (Not Grp A, B or S pneumoniae): Detec    Specimen Source: .Blood Blood-Venous    Organism: Blood Culture PCR    Culture Results:   Growth in anaerobic bottle: Streptococcus anginosus "Susceptibilities not  performed"  "Due to technical problems, Proteus sp. will Not be reported as part of  the BCID panel until further notice"  ***Blood Panel PCR results on this specimen are available  approximately 3 hours after the Gram stain result.***  Gram stain, PCR, and/or culture results may not always  correspond due to difference in methodologies.  ************************************************************  This PCR assay was performed using [employer].  The following targets are tested for: Enterococcus,  vancomycin resistant enterococci, Listeria monocytogenes,  coagulase negative staphylococci, S. aureus,  methicillin resistant S. aureus, Streptococcus agalactiae  (Group B), S. pneumoniae, S. pyogenes (Group A),  Acinetobacter baumannii, Enterobacter cloacae, E. coli,  Klebsiella oxytoca, K. pneumoniae, Proteus sp.,  Serratia marcescens, Haemophilus influenzae,  Neisseria meningitidis, Pseudomonas aeruginosa, Candida  albicans, C. glabrata, C krusei, C parapsilosis,  C. tropicalis and the KPC resistance gene.    Organism Identification: Blood Culture PCR    Method Type: PCR    dw ID: pt needs IV abx, repeat blood cultures. echo  I called the pt and recommended him to come back to the hospital for IVabx  also will do an echo.   pt is going to come back today. Culture - Blood (12.24.19 @ 23:04)    Gram Stain:   Growth in anaerobic bottle: Gram Positive Cocci in Pairs and Chains    -  Streptococcus sp. (Not Grp A, B or S pneumoniae): Detec    Specimen Source: .Blood Blood-Venous    Organism: Blood Culture PCR    Culture Results:   Growth in anaerobic bottle: Streptococcus anginosus "Susceptibilities not  performed"  "Due to technical problems, Proteus sp. will Not be reported as part of  the BCID panel until further notice"  ***Blood Panel PCR results on this specimen are available  approximately 3 hours after the Gram stain result.***  Gram stain, PCR, and/or culture results may not always  correspond due to difference in methodologies.  ************************************************************  This PCR assay was performed using [employer].  The following targets are tested for: Enterococcus,  vancomycin resistant enterococci, Listeria monocytogenes,  coagulase negative staphylococci, S. aureus,  methicillin resistant S. aureus, Streptococcus agalactiae  (Group B), S. pneumoniae, S. pyogenes (Group A),  Acinetobacter baumannii, Enterobacter cloacae, E. coli,  Klebsiella oxytoca, K. pneumoniae, Proteus sp.,  Serratia marcescens, Haemophilus influenzae,  Neisseria meningitidis, Pseudomonas aeruginosa, Candida  albicans, C. glabrata, C krusei, C parapsilosis,  C. tropicalis and the KPC resistance gene.    Organism Identification: Blood Culture PCR    Method Type: PCR    dw ID: pt needs IV abx: Ceftriaxone 2 g q24, repeat blood cultures. echo  I called the pt and recommended him to come back to the hospital for IVabx  also will do an echo.   pt is going to come back today.

## 2019-12-27 NOTE — H&P ADULT - HISTORY OF PRESENT ILLNESS
69 year old male with PMH of HTN, DM II, DLD, CAD s/p CABG (2001) presented to the ED because he was called for IV antibiotics as blood cultures came back positive. As per the patient on december 24th night he started complaining of chills, nausea, vomiting and body aches which came out all of a sudden. He was admitted to the hospital, received one time dose of rocephin 1g and later started to improve. He was able to sleep well, retain his food with out throwing up.   He denies any recurrence of symptoms, feels totally fine. He says he never had these kind of symptoms before, did not have any recent procedure, dental evaluation done. He had colonoscopy done roughly 5-6 years ago which showed diverticulosis. 69 year old male with PMH of HTN, DM II, DLD, CAD s/p CABG (2001) presented to the ED because he was called for IV antibiotics as blood cultures came back positive. As per the patient on december 24th night he started complaining of chills, nausea, vomiting and body aches which came out all of a sudden. He was admitted to the hospital, received one time dose of rocephin 1g and later started to improve. He was able to sleep well, retain his food with out throwing up.   He denies any recurrence of symptoms, feels totally fine. He says he never had these kind of symptoms before, did not have any recent procedure, dental evaluation done, but complained of tooth pain which has resolved. He had colonoscopy done roughly 5-6 years ago which showed diverticulosis.

## 2019-12-27 NOTE — CONSULT NOTE ADULT - SUBJECTIVE AND OBJECTIVE BOX
Date of Admission: 12/27/19    CHIEF COMPLAINT: Chills    HISTORY OF PRESENT ILLNESS: 69 year old male with PMH of HTN, DM II, DLD, CAD s/p CABG (2001) presented to the ED because he was called for IV antibiotics as blood cultures came back positive. As per the patient on december 24th night he started complaining of chills, nausea, vomiting and body aches which came out all of a sudden. He was admitted to the hospital, received one time dose of rocephin 1g and later started to improve. He was able to sleep well, retain his food with out throwing up.   He denies any recurrence of symptoms, feels totally fine. He says he never had these kind of symptoms before, did not have any recent procedure, dental evaluation done, but complained of tooth pain which has resolved. He had colonoscopy done roughly 5-6 years ago which showed diverticulosis. (27 Dec 2019 13:55)      PAST MEDICAL & SURGICAL HISTORY:  Hyperlipidemia  HTN (hypertension)  Type 2 diabetes mellitus  CAD (coronary artery disease)  History of appendectomy  S/P CABG x 3: 2001      FAMILY HISTORY:  [x] no pertinent family history of premature cardiovascular disease in first degree relatives.  Mother:   Father:   Siblings:     SOCIAL HISTORY:    [x] Non-smoker  [ ] Smoker  [ ] Alcohol    Allergies    No Known Allergies    Intolerances    	    REVIEW OF SYSTEMS:  CONSTITUTIONAL: see HPI  CARDIOLOGY: denies chest pain, shortness of breath or syncopal episodes.   RESPIRATORY: denies shortness of breath, wheezing.   NEUROLOGICAL: denies weakness, no focal deficits to report.  ENDOCRINOLOGICAL: no recent change in diabetic medications.   GI: no BRBPR, no N,V, diarrhea.    PSYCHIATRY: normal mood and affect  HEENT: no nasal discharge, no ecchymosis  SKIN: no ecchymosis, no breakdown  MUSCULOSKELETAL: Full range of motion x4.     PHYSICAL EXAM:  T(C): 36.4 (12-27-19 @ 20:23), Max: 36.7 (12-27-19 @ 12:28)  HR: 57 (12-27-19 @ 20:23) (57 - 72)  BP: 181/71 (12-27-19 @ 20:23) (148/76 - 183/75)  RR: 18 (12-27-19 @ 20:23) (16 - 18)  SpO2: 100% (12-27-19 @ 14:14) (96% - 100%)  Wt(kg): --  I&O's Summary      General Appearance: well appearing, normal for age and gender. 	  Neck: normal JVP, no bruit.   Eyes: No xanthomalasia, Extra Ocular muscles intact.   Cardiovascular: regular rate and rhythm S1 S2, No JVD, No murmurs, No edema  Respiratory: Lungs clear to auscultation	  Psychiatry: Alert and oriented x 3, Mood & affect appropriate  Gastrointestinal:  Soft, Non-tender  Skin/Integumen: No rashes, No ecchymoses, No cyanosis	  Neurologic: Non-focal  Musculoskeletal/extremities: Normal range of motion, No clubbing, cyanosis or edema  Vascular: Peripheral pulses palpable 2+ bilaterally    LABS:	 	                          11.8   9.15  )-----------( 250      ( 27 Dec 2019 15:10 )             36.9     12-27    141  |  100  |  37<H>  ----------------------------<  131<H>  4.6   |  22  |  1.9<H>    Ca    10.4<H>      27 Dec 2019 15:10    TPro  8.5<H>  /  Alb  5.0  /  TBili  0.5  /  DBili  x   /  AST  25  /  ALT  23  /  AlkPhos  87  12-27      ECG:  	NSR @ 88 bpm,  ms  RADIOLOGY:  < from: Xray Chest 1 View-PORTABLE IMMEDIATE (12.24.19 @ 23:51) >  Impression:      No radiographic evidence of acute pulmonary disease.    < end of copied text >    OTHER: 	  -  Streptococcus sp. (Not Grp A, B or S pneumoniae): Detec (12.24.19 @ 23:04)    PREVIOUS DIAGNOSTIC TESTING:    [ ] Echocardiogram:  < from: Transthoracic Echocardiogram (12.27.19 @ 15:12) >  Summary:   1. Normal global left ventricular systolic function.   2. Mild mitral annular calcification.   3. Mild aortic regurgitation.   4. Cant exclude small vegetation on AV.    < end of copied text >    [ ] Catheterization:  [ ] Stress Test:  	  	    Home Medications:  Aspir 81 oral delayed release tablet: 1 tab(s) orally once a day (27 Dec 2019 14:02)  lisinopril 20 mg oral tablet: 1 tab(s) orally once a day (25 Dec 2019 03:04)  metoprolol succinate 100 mg oral tablet, extended release: 1 tab(s) orally once a day (25 Dec 2019 03:03)  NovoLOG FlexPen 100 units/mL injectable solution: 5 unit(s) injectable 2 times a day (with meals), As Needed (25 Dec 2019 03:05)  simvastatin 40 mg oral tablet: 1 tab(s) orally once a day (at bedtime) (25 Dec 2019 03:05)  Tresiba 100 units/mL subcutaneous solution: 28 unit(s) subcutaneous once a day (at bedtime) (25 Dec 2019 03:04)    MEDICATIONS  (STANDING):  aspirin enteric coated 81 milliGRAM(s) Oral daily  cefTRIAXone   IVPB 2000 milliGRAM(s) IV Intermittent every 24 hours  chlorhexidine 4% Liquid 1 Application(s) Topical <User Schedule>  dextrose 5%. 1000 milliLiter(s) (50 mL/Hr) IV Continuous <Continuous>  dextrose 50% Injectable 12.5 Gram(s) IV Push once  dextrose 50% Injectable 25 Gram(s) IV Push once  dextrose 50% Injectable 25 Gram(s) IV Push once  heparin  Injectable 5000 Unit(s) SubCutaneous every 12 hours  influenza   Vaccine 0.5 milliLiter(s) IntraMuscular once  insulin glargine Injectable (LANTUS) 28 Unit(s) SubCutaneous at bedtime  lisinopril 20 milliGRAM(s) Oral daily  metoprolol succinate  milliGRAM(s) Oral daily  simvastatin 40 milliGRAM(s) Oral at bedtime    MEDICATIONS  (PRN):  dextrose 40% Gel 15 Gram(s) Oral once PRN Blood Glucose LESS THAN 70 milliGRAM(s)/deciliter  glucagon  Injectable 1 milliGRAM(s) IntraMuscular once PRN Glucose LESS THAN 70 milligrams/deciliter Date of Admission: 12/27/19    CHIEF COMPLAINT: Chills    HISTORY OF PRESENT ILLNESS: 69 year old male with PMH of HTN, DM II, DLD, CAD s/p CABG (2001) presented to the ED because he was called for IV antibiotics as blood cultures came back positive. As per the patient on december 24th night he started complaining of chills, nausea, vomiting and body aches which came out all of a sudden. He was admitted to the hospital, received one time dose of rocephin 1g and later started to improve. He was able to sleep well, retain his food with out throwing up.   He denies any recurrence of symptoms, feels totally fine. He says he never had these kind of symptoms before, did not have any recent procedure, dental evaluation done, but complained of tooth pain which has resolved. He had colonoscopy done roughly 5-6 years ago which showed diverticulosis. (27 Dec 2019 13:55)      PAST MEDICAL & SURGICAL HISTORY:  Hyperlipidemia  HTN (hypertension)  Type 2 diabetes mellitus  CAD (coronary artery disease)  History of appendectomy  S/P CABG x 3: 2001      FAMILY HISTORY:  [x] no pertinent family history of premature cardiovascular disease in first degree relatives.  Mother:   Father:   Siblings:     SOCIAL HISTORY:    [x] Non-smoker  [ ] Smoker  [ ] Alcohol    Allergies    No Known Allergies    Intolerances    	    REVIEW OF SYSTEMS:  CONSTITUTIONAL: see HPI  CARDIOLOGY: denies chest pain, shortness of breath or syncopal episodes.   RESPIRATORY: denies shortness of breath, wheezing.   NEUROLOGICAL: denies weakness, no focal deficits to report.  ENDOCRINOLOGICAL: no recent change in diabetic medications.   GI: no BRBPR, no N,V, diarrhea.    PSYCHIATRY: normal mood and affect  HEENT: no nasal discharge, no ecchymosis  SKIN: no ecchymosis, no breakdown  MUSCULOSKELETAL: Full range of motion x4.     PHYSICAL EXAM:  T(C): 36.4 (12-27-19 @ 20:23), Max: 36.7 (12-27-19 @ 12:28)  HR: 57 (12-27-19 @ 20:23) (57 - 72)  BP: 181/71 (12-27-19 @ 20:23) (148/76 - 183/75)  RR: 18 (12-27-19 @ 20:23) (16 - 18)  SpO2: 100% (12-27-19 @ 14:14) (96% - 100%)  Wt(kg): --  I&O's Summary      General Appearance: well appearing, normal for age and gender. 	  Neck: normal JVP, no bruit.   Oral: poor dental hygiene   Eyes: No xanthomalasia, Extra Ocular muscles intact.   Cardiovascular: regular rate and rhythm S1 S2, No JVD, No murmurs, No edema  Respiratory: Lungs clear to auscultation	  Psychiatry: Alert and oriented x 3, Mood & affect appropriate  Gastrointestinal:  Soft, Non-tender  Skin/Integumen: No rashes, No ecchymoses, No cyanosis	  Neurologic: Non-focal  Musculoskeletal/extremities: Normal range of motion, No clubbing, cyanosis or edema  Vascular: Peripheral pulses palpable 2+ bilaterally    LABS:	 	                          11.8   9.15  )-----------( 250      ( 27 Dec 2019 15:10 )             36.9     12-27    141  |  100  |  37<H>  ----------------------------<  131<H>  4.6   |  22  |  1.9<H>    Ca    10.4<H>      27 Dec 2019 15:10    TPro  8.5<H>  /  Alb  5.0  /  TBili  0.5  /  DBili  x   /  AST  25  /  ALT  23  /  AlkPhos  87  12-27      ECG:  	NSR @ 88 bpm,  ms  RADIOLOGY:  < from: Xray Chest 1 View-PORTABLE IMMEDIATE (12.24.19 @ 23:51) >  Impression:      No radiographic evidence of acute pulmonary disease.    < end of copied text >    OTHER: 	  -  Streptococcus sp. (Not Grp A, B or S pneumoniae): Detec (12.24.19 @ 23:04)    PREVIOUS DIAGNOSTIC TESTING:    [ ] Echocardiogram:  < from: Transthoracic Echocardiogram (12.27.19 @ 15:12) >  Summary:   1. Normal global left ventricular systolic function.   2. Mild mitral annular calcification.   3. Mild aortic regurgitation.   4. Cant exclude small vegetation on AV.    < end of copied text >  	    Home Medications:  Aspir 81 oral delayed release tablet: 1 tab(s) orally once a day (27 Dec 2019 14:02)  lisinopril 20 mg oral tablet: 1 tab(s) orally once a day (25 Dec 2019 03:04)  metoprolol succinate 100 mg oral tablet, extended release: 1 tab(s) orally once a day (25 Dec 2019 03:03)  NovoLOG FlexPen 100 units/mL injectable solution: 5 unit(s) injectable 2 times a day (with meals), As Needed (25 Dec 2019 03:05)  simvastatin 40 mg oral tablet: 1 tab(s) orally once a day (at bedtime) (25 Dec 2019 03:05)  Tresiba 100 units/mL subcutaneous solution: 28 unit(s) subcutaneous once a day (at bedtime) (25 Dec 2019 03:04)    MEDICATIONS  (STANDING):  aspirin enteric coated 81 milliGRAM(s) Oral daily  cefTRIAXone   IVPB 2000 milliGRAM(s) IV Intermittent every 24 hours  chlorhexidine 4% Liquid 1 Application(s) Topical <User Schedule>  dextrose 5%. 1000 milliLiter(s) (50 mL/Hr) IV Continuous <Continuous>  dextrose 50% Injectable 12.5 Gram(s) IV Push once  dextrose 50% Injectable 25 Gram(s) IV Push once  dextrose 50% Injectable 25 Gram(s) IV Push once  heparin  Injectable 5000 Unit(s) SubCutaneous every 12 hours  influenza   Vaccine 0.5 milliLiter(s) IntraMuscular once  insulin glargine Injectable (LANTUS) 28 Unit(s) SubCutaneous at bedtime  lisinopril 20 milliGRAM(s) Oral daily  metoprolol succinate  milliGRAM(s) Oral daily  simvastatin 40 milliGRAM(s) Oral at bedtime    MEDICATIONS  (PRN):  dextrose 40% Gel 15 Gram(s) Oral once PRN Blood Glucose LESS THAN 70 milliGRAM(s)/deciliter  glucagon  Injectable 1 milliGRAM(s) IntraMuscular once PRN Glucose LESS THAN 70 milligrams/deciliter

## 2019-12-27 NOTE — ED PROVIDER NOTE - ATTENDING CONTRIBUTION TO CARE
68yo M with PMHx HTN, HLD, DM, CAD/CABG, called back to ED by Dr. Cruz for positive blood cultures. Pt was recently admitted 2 days ago for nausea, vomiting, myalgia, chills. CT showing enteritis. Patient currently has no acute complaints.

## 2019-12-27 NOTE — CONSULT NOTE ADULT - ASSESSMENT
Patient is a 69y old  Male who presents with a chief complaint of chills. Found to have positive blood cultures, called back for re-admission.     Bacteremia with streptococcus species with suspicion of small Ao valve vegetation and mild AI  Hyperlipidemia  HTN (hypertension)  Type 2 diabetes mellitus  CAD (coronary artery disease) s/p CABG 2001    Recommendations:  Repeat blood cx x2, obtain for different sites  Monitor CBC  ESR  Continue antibiotics   ID evaluation  Possible FIORELLA if remains bacteremic Patient is a 69y old  Male who presents with a chief complaint of chills. Found to have positive blood cultures, called back for re-admission.     Bacteremia with streptococcus species with suspicion of small Ao valve vegetation and mild AI  Hyperlipidemia  HTN (hypertension)  Type 2 diabetes mellitus  CAD (coronary artery disease) s/p CABG 2001    Recommendations:  Repeat blood cx x2, obtain from different sites  Monitor CBC  ESR  Continue antibiotics   ID evaluation  Possible FIORELLA if remains bacteremic Patient is a 69y old  Male who presents with a chief complaint of chills. Found to have positive blood cultures, called back for re-admission.     Bacteremia with streptococcus species with suspicion of small Ao valve vegetation and mild AI, recent toothache that resolved after antibiotics  Hyperlipidemia  HTN (hypertension)  Type 2 diabetes mellitus  CAD (coronary artery disease) s/p CABG 2001    Recommendations:  Repeat blood cx x2, obtain from different sites  Monitor CBC  Serial EKGs  ESR  Continue antibiotics   ID evaluation  Possible FIORELLA if remains bacteremic Patient is a 69y old  Male who presents with a chief complaint of chills. Found to have positive blood cultures, called back for re-admission.     Bacteremia with streptococcus species with suspicion of small Ao valve vegetation and mild AI, recent toothache that resolved after antibiotics  Hyperlipidemia  HTN (hypertension)  Type 2 diabetes mellitus  CAD (coronary artery disease) s/p CABG 2001    Recommendations:  Repeat blood cx x2, obtain from different sites  Monitor CBC  Serial EKGs  ESR  Continue antibiotics   ID evaluation  FIORELLA Monday or Tuesday will determine by tomorrow

## 2019-12-27 NOTE — ED PROVIDER NOTE - NS ED ROS FT
Constitutional: no fever, chills or generalized weakness  ENT: no URI sxs, no throat pain, no change in voice, no excessive drooling, no ear pain  Cardiovascular: no chest pain, no sob  Respiratory: no cough, no shortness of breath  Gastrointestinal: no nausea, vomiting or diarrhea. no abdominal pain.   :  no urinary sxs, no flank pain.  Musculoskeletal: no msk pain or injury.  Integumentary: no rash or skin changes. no edema  Neurological: no headache, no dizziness, no visual changes, no UE/LE weakness or paresthesias.   Psychiatric: no suicidal or homicidal ideation or attempt. no hallucinations.   Allergic/Immunologic: no lymphadenopathy, no pruritus

## 2019-12-27 NOTE — H&P ADULT - ASSESSMENT
# Positive blood cultures with Strep Species-Likely contamination??  -Repeat Blood cultures. Follow up with Sensitivities.   -2d echo ordered to rule out endocarditis. If negative may need FIORELLA. follow up with ID recommendations.   -Will keep on 2g Rocephin for now. Adjust as per the sensitivities.     # LAURA vs CKD likely secondary to DM  - Cr 1.8 (unknown baseline)  -Monitor BMP    # DM II  -Continue with Insulin regimen.   - Monitor Fingerstick glucose      # HTN   - Continue Toprol and Lisinopril    # DLD   - Continue Simvastatin    # CAD s/p CABG   - Continue ASA    # DVT prophylaxis  -On heparin SQ

## 2019-12-27 NOTE — ED PROVIDER NOTE - PHYSICAL EXAMINATION
GENERAL:  well appearing, non-toxic male in no acute distress  SKIN: skin warm, pink and dry. MMM. no rash.   HEAD: NC, AT  EYE: Normal lids, conjunctiva and sclera, PERRL, EOMI  ENT:  Airway intact. Patent oropharynx ,  PULM: CTAB. Normal respiratory effort. No respiratory distress. No wheezes, stridor, rales or rhonchi. No retractions  CV: RRR, no M/R/G.   ABD: Soft, non-tender, non-distended  MSK: Moving all extremities. No edema, erythema, cyanosis. Distal pulses intact.  NEURO: A+Ox3, no sensory/motor deficits  PSYCH: appropriate behavior, cooperative.

## 2019-12-27 NOTE — ED ADULT NURSE NOTE - OBJECTIVE STATEMENT
Patient was here on 12/25 for N/V and dc'd with enteritis. Patient called back in for positive cultures.

## 2019-12-27 NOTE — H&P ADULT - NSHPLABSRESULTS_GEN_ALL_CORE
Complete Blood Count + Automated Diff in AM (12.25.19 @ 05:55)    WBC Count: 11.24 K/uL    RBC Count: 3.33 M/uL    Hemoglobin: 9.5 g/dL    Hematocrit: 30.3 %    Mean Cell Volume: 91.0 fL    Mean Cell Hemoglobin: 28.5 pg    Mean Cell Hemoglobin Conc: 31.4 g/dL    Red Cell Distrib Width: 11.8 %    Platelet Count - Automated: 205 K/uL    Auto Neutrophil #: 9.19 K/uL    Auto Lymphocyte #: 1.01 K/uL    Auto Monocyte #: 0.94 K/uL    Auto Eosinophil #: 0.03 K/uL    Auto Basophil #: 0.03 K/uL    Auto Neutrophil %: 81.6: Differential percentages must be correlated with absolute numbers for  clinical significance. %    Auto Lymphocyte %: 9.0 %    Auto Monocyte %: 8.4 %    Auto Eosinophil %: 0.3 %    Auto Basophil %: 0.3 %    Auto Immature Granulocyte %: 0.4 %    Nucleated RBC: 0 /100 WBCs  Basic Metabolic Panel in AM (12.25.19 @ 05:55)    Sodium, Serum: 141 mmol/L    Potassium, Serum: 4.8 mmol/L    Chloride, Serum: 108 mmol/L    Carbon Dioxide, Serum: 19 mmol/L    Anion Gap, Serum: 14 mmol/L    Blood Urea Nitrogen, Serum: 44 mg/dL    Creatinine, Serum: 1.9 mg/dL    Glucose, Serum: 227 mg/dL    Calcium, Total Serum: 9.0 mg/dL    eGFR if Non : 35: Interpretative comment  The units for eGFR are mL/min/1.73M2 (normalized body surface area). The  eGFR is calculated from a serum creatinine using the CKD-EPI equation.  Other variables required for calculation are race, age and sex. Among  patients with chronic kidney disease (CKD), the eGFR is useful in  determining the stage of disease according to KDOQI CKD classification.  All eGFR results are reported numerically with the following  interpretation.          GFR                    With                 Without     (ml/min/1.73 m2)    Kidney Damage       Kidney Damage        >= 90                    Stage 1                     Normal        60-89                    Stage 2                     Decreased GFR        30-59     Stage 3                     Stage 3        15-29                    Stage 4                     Stage 4        < 15                      Stage 5                     Stage 5  Each stage of CKD assumes that the associated GFR level has been in  effect for at least 3 months. Determination of stages one and two (with  eGFR > 59 ml/min/m2) requires estimation of kidney damage for at least 3  months as defined by structural or functional abnormalities.  Limitations: All estimates of GFR will be less accurate for patients at  extremes of muscle mass (including but not limited to frail elderly,  critically ill, or cancer patients), those with unusual diets, and those  with conditions associated with reduced secretion or extrarenal  elimination of creatinine. The eGFR equation is not recommended for use  in patients with unstable creatinine levels. mL/min/1.73M2    eGFR if African American: 40 mL/min/1.73M2  < from: CT Abdomen and Pelvis No Cont (12.25.19 @ 01:48) >    IMPRESSION:     Mesenteric stranding surrounding the proximal duodenum with circumferential thickening of the duodenal wall may represent a component of enteritis.    Additional attending comments: No definitive CT evidence for acute intra-abdominal or pelvic pathology    < end of copied text >

## 2019-12-27 NOTE — PATIENT PROFILE ADULT - VISION (WITH CORRECTIVE LENSES IF THE PATIENT USUALLY WEARS THEM):
Left eye blind/Normal vision: sees adequately in most situations; can see medication labels, newsprint

## 2019-12-28 LAB
-  CEFTRIAXONE: SIGNIFICANT CHANGE UP
-  CLINDAMYCIN: SIGNIFICANT CHANGE UP
-  ERYTHROMYCIN: SIGNIFICANT CHANGE UP
-  LEVOFLOXACIN: SIGNIFICANT CHANGE UP
-  PENICILLIN: SIGNIFICANT CHANGE UP
-  VANCOMYCIN: SIGNIFICANT CHANGE UP
24R-OH-CALCIDIOL SERPL-MCNC: 19 NG/ML — LOW (ref 30–80)
ALBUMIN SERPL ELPH-MCNC: 4 G/DL — SIGNIFICANT CHANGE UP (ref 3.5–5.2)
ALP SERPL-CCNC: 67 U/L — SIGNIFICANT CHANGE UP (ref 30–115)
ALT FLD-CCNC: 19 U/L — SIGNIFICANT CHANGE UP (ref 0–41)
ANION GAP SERPL CALC-SCNC: 15 MMOL/L — HIGH (ref 7–14)
AST SERPL-CCNC: 20 U/L — SIGNIFICANT CHANGE UP (ref 0–41)
BILIRUB SERPL-MCNC: 0.4 MG/DL — SIGNIFICANT CHANGE UP (ref 0.2–1.2)
BLD GP AB SCN SERPL QL: SIGNIFICANT CHANGE UP
BUN SERPL-MCNC: 34 MG/DL — HIGH (ref 10–20)
CALCIUM SERPL-MCNC: 9.3 MG/DL — SIGNIFICANT CHANGE UP (ref 8.4–10.5)
CALCIUM SERPL-MCNC: 9.3 MG/DL — SIGNIFICANT CHANGE UP (ref 8.5–10.1)
CHLORIDE SERPL-SCNC: 105 MMOL/L — SIGNIFICANT CHANGE UP (ref 98–110)
CO2 SERPL-SCNC: 24 MMOL/L — SIGNIFICANT CHANGE UP (ref 17–32)
CREAT SERPL-MCNC: 1.8 MG/DL — HIGH (ref 0.7–1.5)
CRP SERPL-MCNC: 1.8 MG/DL — HIGH (ref 0–0.4)
CULTURE RESULTS: SIGNIFICANT CHANGE UP
FERRITIN SERPL-MCNC: 309 NG/ML — SIGNIFICANT CHANGE UP (ref 30–400)
FOLATE SERPL-MCNC: 8.8 NG/ML — SIGNIFICANT CHANGE UP
GLUCOSE BLDC GLUCOMTR-MCNC: 136 MG/DL — HIGH (ref 70–99)
GLUCOSE BLDC GLUCOMTR-MCNC: 229 MG/DL — HIGH (ref 70–99)
GLUCOSE BLDC GLUCOMTR-MCNC: 241 MG/DL — HIGH (ref 70–99)
GLUCOSE BLDC GLUCOMTR-MCNC: 269 MG/DL — HIGH (ref 70–99)
GLUCOSE SERPL-MCNC: 71 MG/DL — SIGNIFICANT CHANGE UP (ref 70–99)
HCT VFR BLD CALC: 30.7 % — LOW (ref 42–52)
HGB BLD-MCNC: 9.8 G/DL — LOW (ref 14–18)
IRON SATN MFR SERPL: 33 % — SIGNIFICANT CHANGE UP (ref 15–50)
IRON SATN MFR SERPL: 74 UG/DL — SIGNIFICANT CHANGE UP (ref 35–150)
MCHC RBC-ENTMCNC: 28.7 PG — SIGNIFICANT CHANGE UP (ref 27–31)
MCHC RBC-ENTMCNC: 31.9 G/DL — LOW (ref 32–37)
MCV RBC AUTO: 89.8 FL — SIGNIFICANT CHANGE UP (ref 80–94)
METHOD TYPE: SIGNIFICANT CHANGE UP
METHOD TYPE: SIGNIFICANT CHANGE UP
NRBC # BLD: 0 /100 WBCS — SIGNIFICANT CHANGE UP (ref 0–0)
ORGANISM # SPEC MICROSCOPIC CNT: SIGNIFICANT CHANGE UP
PLATELET # BLD AUTO: 218 K/UL — SIGNIFICANT CHANGE UP (ref 130–400)
POTASSIUM SERPL-MCNC: 4.3 MMOL/L — SIGNIFICANT CHANGE UP (ref 3.5–5)
POTASSIUM SERPL-SCNC: 4.3 MMOL/L — SIGNIFICANT CHANGE UP (ref 3.5–5)
PROT SERPL-MCNC: 6.6 G/DL — SIGNIFICANT CHANGE UP (ref 6–8)
PTH-INTACT FLD-MCNC: 52 PG/ML — SIGNIFICANT CHANGE UP (ref 15–65)
RBC # BLD: 3.42 M/UL — LOW (ref 4.7–6.1)
RBC # FLD: 11.8 % — SIGNIFICANT CHANGE UP (ref 11.5–14.5)
SODIUM SERPL-SCNC: 144 MMOL/L — SIGNIFICANT CHANGE UP (ref 135–146)
SPECIMEN SOURCE: SIGNIFICANT CHANGE UP
TIBC SERPL-MCNC: 222 UG/DL — SIGNIFICANT CHANGE UP (ref 220–430)
TRANSFERRIN SERPL-MCNC: 184 MG/DL — LOW (ref 200–360)
UIBC SERPL-MCNC: 148 UG/DL — SIGNIFICANT CHANGE UP (ref 110–370)
VIT B12 SERPL-MCNC: 493 PG/ML — SIGNIFICANT CHANGE UP (ref 232–1245)
WBC # BLD: 7.36 K/UL — SIGNIFICANT CHANGE UP (ref 4.8–10.8)
WBC # FLD AUTO: 7.36 K/UL — SIGNIFICANT CHANGE UP (ref 4.8–10.8)

## 2019-12-28 PROCEDURE — 99222 1ST HOSP IP/OBS MODERATE 55: CPT

## 2019-12-28 PROCEDURE — 99231 SBSQ HOSP IP/OBS SF/LOW 25: CPT

## 2019-12-28 RX ORDER — INSULIN LISPRO 100/ML
5 VIAL (ML) SUBCUTANEOUS
Refills: 0 | Status: DISCONTINUED | OUTPATIENT
Start: 2019-12-28 | End: 2019-12-31

## 2019-12-28 RX ORDER — INSULIN LISPRO 100/ML
VIAL (ML) SUBCUTANEOUS
Refills: 0 | Status: DISCONTINUED | OUTPATIENT
Start: 2019-12-28 | End: 2019-12-31

## 2019-12-28 RX ADMIN — Medication 3: at 17:33

## 2019-12-28 RX ADMIN — INSULIN GLARGINE 28 UNIT(S): 100 INJECTION, SOLUTION SUBCUTANEOUS at 21:58

## 2019-12-28 RX ADMIN — Medication 5 UNIT(S): at 17:33

## 2019-12-28 RX ADMIN — Medication 81 MILLIGRAM(S): at 12:12

## 2019-12-28 RX ADMIN — CEFTRIAXONE 100 MILLIGRAM(S): 500 INJECTION, POWDER, FOR SOLUTION INTRAMUSCULAR; INTRAVENOUS at 21:58

## 2019-12-28 RX ADMIN — LISINOPRIL 20 MILLIGRAM(S): 2.5 TABLET ORAL at 06:05

## 2019-12-28 RX ADMIN — SIMVASTATIN 40 MILLIGRAM(S): 20 TABLET, FILM COATED ORAL at 21:58

## 2019-12-28 RX ADMIN — Medication 100 MILLIGRAM(S): at 06:05

## 2019-12-28 NOTE — PROGRESS NOTE ADULT - ASSESSMENT
69 year old male with PMH of HTN, DM II, DLD, CAD s/p CABG (2001) recently presented to ED for viral gastroenteritis, at that visit (12/25/19) blood cultures were drawn positive for -  Streptococcus sp. (Not Grp A, B or S pneumoniae): Detec (12.24.19 @ 23:04), patient was called back  for further work up.     # Positive blood cultures with Strep Species  - unclear if its contaminated, blood cultures are repeated, to confirm if indeed it is contaminated or not  - per ID started c/w Rocephin  -cardiology following the case would like to rule out vegetation (FIORELLA Monday or Tuesday per cardio note ) > please follow up with cardio, 2d echo no vegetation    # LAURA vs CKD likely secondary to DM  - Cr 1.8 (unknown baseline)  -Monitor BMP    # DM II  -Continue with Insulin regimen.   - Monitor Fingerstick glucose    # HTN   - Continue Toprol and Lisinopril    # DLD   - Continue Simvastatin    # CAD s/p CABG   - Continue ASA    Activity: IAT  Diet: DASH/TLC  GI/DVT: no protonix, on hep  Dispo: FIORELLA next week  Code: full 69 year old male with PMH of HTN, DM II, DLD, CAD s/p CABG (2001) recently presented to ED for viral gastroenteritis, at that visit (12/25/19) blood cultures were drawn positive for -  Streptococcus sp. (Not Grp A, B or S pneumoniae): Detec (12.24.19 @ 23:04), patient was called back  for further work up.     # Positive blood cultures with Strep Species  - , blood cultures are repeated, to confirm if indeed it is contaminated or not  - per ID started c/w Rocephin  -cardiology following the case would like to rule out vegetation (FIORELLA Monday or Tuesday per cardio note ) > please follow up with cardio, 2d echo no vegetation    # CKD likely secondary to DM  - Cr 1.8 (unknown baseline)  -Monitor BMP    # DM II  -Continue with Insulin regimen.   - Monitor Fingerstick glucose    # HTN   - Continue Toprol and Lisinopril    # DLD   - Continue Simvastatin    # CAD s/p CABG   - Continue ASA    Activity: IAT  Diet: DASH/TLC  GI/DVT: no protonix, on hep  Dispo: FIORELLA next week  Code: full

## 2019-12-28 NOTE — CONSULT NOTE ADULT - SUBJECTIVE AND OBJECTIVE BOX
MILLIERAMIN  69y, Male  Allergy: No Known Allergies      All historical available data reviewed.    HPI:  69 year old male with PMH of HTN, DM II, DLD, CAD s/p CABG (2001) presented to the ED because he was called for IV antibiotics as blood cultures came back positive. As per the patient on december 24th night he started complaining of chills, nausea, vomiting and body aches which came out all of a sudden. He was admitted to the hospital, received one time dose of rocephin 1g and later started to improve. He was able to sleep well, retain his food with out throwing up.   He denies any recurrence of symptoms, feels totally fine. He says he never had these kind of symptoms before, did not have any recent procedure, dental evaluation done, but complained of tooth pain which has resolved. He had colonoscopy done roughly 5-6 years ago which showed diverticulosis. (27 Dec 2019 13:55)  ID called for bacteremia    FAMILY HISTORY:    PAST MEDICAL & SURGICAL HISTORY:  Hyperlipidemia  HTN (hypertension)  Type 2 diabetes mellitus  CAD (coronary artery disease)  History of appendectomy  S/P CABG x 3: 2001        VITALS:  T(F): 97.7, Max: 98 (12-27-19 @ 12:28)  HR: 79  BP: 161/73  RR: 18Vital Signs Last 24 Hrs  T(C): 36.5 (28 Dec 2019 05:26), Max: 36.7 (27 Dec 2019 12:28)  T(F): 97.7 (28 Dec 2019 05:26), Max: 98 (27 Dec 2019 12:28)  HR: 79 (28 Dec 2019 05:26) (57 - 79)  BP: 161/73 (28 Dec 2019 05:26) (148/76 - 183/75)  BP(mean): --  RR: 18 (28 Dec 2019 05:26) (16 - 18)  SpO2: 100% (27 Dec 2019 14:14) (96% - 100%)    TESTS & MEASUREMENTS:                        11.8   9.15  )-----------( 250      ( 27 Dec 2019 15:10 )             36.9     12-27    141  |  100  |  37<H>  ----------------------------<  131<H>  4.6   |  22  |  1.9<H>    Ca    10.4<H>      27 Dec 2019 15:10    TPro  8.5<H>  /  Alb  5.0  /  TBili  0.5  /  DBili  x   /  AST  25  /  ALT  23  /  AlkPhos  87  12-27    LIVER FUNCTIONS - ( 27 Dec 2019 15:10 )  Alb: 5.0 g/dL / Pro: 8.5 g/dL / ALK PHOS: 87 U/L / ALT: 23 U/L / AST: 25 U/L / GGT: x             Culture - Urine (collected 12-25-19 @ 00:00)  Source: .Urine Clean Catch (Midstream)  Final Report (12-26-19 @ 06:38):    No growth    Culture - Blood (collected 12-24-19 @ 23:04)  Source: .Blood Blood-Venous  Gram Stain (12-26-19 @ 02:53):    Growth in anaerobic bottle: Gram Positive Cocci in Pairs and Chains  Final Report (12-26-19 @ 23:59):    Growth in anaerobic bottle: Streptococcus anginosus "Susceptibilities not    performed"    "Due to technical problems, Proteus sp. will Not be reported as part of    the BCID panel until further notice"    ***Blood Panel PCR results on this specimen are available    approximately 3 hours after the Gram stain result.***    Gram stain, PCR, and/or culture results may not always    correspond due to difference in methodologies.    ************************************************************    This PCR assay was performed using [employer].    The following targets are tested for: Enterococcus,    vancomycin resistant enterococci, Listeria monocytogenes,    coagulase negative staphylococci, S. aureus,    methicillin resistant S. aureus, Streptococcus agalactiae    (Group B), S. pneumoniae, S. pyogenes (Group A),    Acinetobacter baumannii, Enterobacter cloacae, E. coli,    Klebsiella oxytoca, K. pneumoniae, Proteus sp.,    Serratia marcescens, Haemophilus influenzae,    Neisseria meningitidis, Pseudomonas aeruginosa, Candida    albicans, C. glabrata, C krusei, C parapsilosis,    C. tropicalis and the KPC resistance gene.  Organism: Blood Culture PCR (12-26-19 @ 23:59)  Organism: Blood Culture PCR (12-26-19 @ 23:59)      -  Streptococcus sp. (Not Grp A, B or S pneumoniae): Detec      Method Type: PCR    Culture - Blood (collected 12-24-19 @ 23:04)  Source: .Blood Blood-Venous  Gram Stain (12-26-19 @ 09:51):    Growth in anaerobic bottle: Gram Positive Cocci in Pairs and Chains  Preliminary Report (12-26-19 @ 09:52):    Growth in anaerobic bottle: Gram Positive Cocci in Pairs and Chains            RADIOLOGY & ADDITIONAL TESTS:  Personal review of radiological diagnostics performed  Echo and EKG results noted when applicable.     ANTIBIOTICS:  cefTRIAXone   IVPB 2000 milliGRAM(s) IV Intermittent every 24 hours MILLIERAMIN  69y, Male  Allergy: No Known Allergies      All historical available data reviewed.    HPI:  69 year old male with PMH of HTN, DM II, DLD, CAD s/p CABG (2001) presented to the ED because he was called for IV antibiotics as blood cultures came back positive. As per the patient on december 24th night he started complaining of chills, nausea, vomiting and body aches which came out all of a sudden. He was admitted to the hospital, received one time dose of rocephin 1g and later started to improve. He was able to sleep well, retain his food with out throwing up.   He denies any recurrence of symptoms, feels totally fine. He says he never had these kind of symptoms before, did not have any recent procedure. On 12/23 experienced suden onset of denatal/gum pain right lower jaw which resolved on 12/26. He had colonoscopy done roughly 5-6 years ago which showed diverticulosis. (27 Dec 2019 13:55)  ID called for bacteremia    FAMILY HISTORY:    PAST MEDICAL & SURGICAL HISTORY:  Hyperlipidemia  HTN (hypertension)  Type 2 diabetes mellitus  CAD (coronary artery disease)  History of appendectomy  S/P CABG x 3: 2001        VITALS:  T(F): 97.7, Max: 98 (12-27-19 @ 12:28)  HR: 79  BP: 161/73  RR: 18Vital Signs Last 24 Hrs  T(C): 36.5 (28 Dec 2019 05:26), Max: 36.7 (27 Dec 2019 12:28)  T(F): 97.7 (28 Dec 2019 05:26), Max: 98 (27 Dec 2019 12:28)  HR: 79 (28 Dec 2019 05:26) (57 - 79)  BP: 161/73 (28 Dec 2019 05:26) (148/76 - 183/75)  BP(mean): --  RR: 18 (28 Dec 2019 05:26) (16 - 18)  SpO2: 100% (27 Dec 2019 14:14) (96% - 100%)    TESTS & MEASUREMENTS:                        11.8   9.15  )-----------( 250      ( 27 Dec 2019 15:10 )             36.9     12-27    141  |  100  |  37<H>  ----------------------------<  131<H>  4.6   |  22  |  1.9<H>    Ca    10.4<H>      27 Dec 2019 15:10    TPro  8.5<H>  /  Alb  5.0  /  TBili  0.5  /  DBili  x   /  AST  25  /  ALT  23  /  AlkPhos  87  12-27    LIVER FUNCTIONS - ( 27 Dec 2019 15:10 )  Alb: 5.0 g/dL / Pro: 8.5 g/dL / ALK PHOS: 87 U/L / ALT: 23 U/L / AST: 25 U/L / GGT: x             Culture - Urine (collected 12-25-19 @ 00:00)  Source: .Urine Clean Catch (Midstream)  Final Report (12-26-19 @ 06:38):    No growth    Culture - Blood (collected 12-24-19 @ 23:04)  Source: .Blood Blood-Venous  Gram Stain (12-26-19 @ 02:53):    Growth in anaerobic bottle: Gram Positive Cocci in Pairs and Chains  Final Report (12-26-19 @ 23:59):    Growth in anaerobic bottle: Streptococcus anginosus "Susceptibilities not    performed"    "Due to technical problems, Proteus sp. will Not be reported as part of    the BCID panel until further notice"    ***Blood Panel PCR results on this specimen are available    approximately 3 hours after the Gram stain result.***    Gram stain, PCR, and/or culture results may not always    correspond due to difference in methodologies.    ************************************************************    This PCR assay was performed using [employer].    The following targets are tested for: Enterococcus,    vancomycin resistant enterococci, Listeria monocytogenes,    coagulase negative staphylococci, S. aureus,    methicillin resistant S. aureus, Streptococcus agalactiae    (Group B), S. pneumoniae, S. pyogenes (Group A),    Acinetobacter baumannii, Enterobacter cloacae, E. coli,    Klebsiella oxytoca, K. pneumoniae, Proteus sp.,    Serratia marcescens, Haemophilus influenzae,    Neisseria meningitidis, Pseudomonas aeruginosa, Candida    albicans, C. glabrata, C krusei, C parapsilosis,    C. tropicalis and the KPC resistance gene.  Organism: Blood Culture PCR (12-26-19 @ 23:59)  Organism: Blood Culture PCR (12-26-19 @ 23:59)      -  Streptococcus sp. (Not Grp A, B or S pneumoniae): Detec      Method Type: PCR    Culture - Blood (collected 12-24-19 @ 23:04)  Source: .Blood Blood-Venous  Gram Stain (12-26-19 @ 09:51):    Growth in anaerobic bottle: Gram Positive Cocci in Pairs and Chains  Preliminary Report (12-26-19 @ 09:52):    Growth in anaerobic bottle: Gram Positive Cocci in Pairs and Chains            RADIOLOGY & ADDITIONAL TESTS:  Personal review of radiological diagnostics performed  Echo and EKG results noted when applicable.     ANTIBIOTICS:  cefTRIAXone   IVPB 2000 milliGRAM(s) IV Intermittent every 24 hours

## 2019-12-28 NOTE — CONSULT NOTE ADULT - ASSESSMENT
69y old  Male who presents with a chief complaint of chills. Found  to have positive blood cultures, called back for re-admission.     IMPRESSION:  Strep anginosus bacteremia which is never a contaminant  Possible sources are AV endocarditis or dental origin ( which is more probable as the pt was symptomatic 1 day prior to the onset of acute chills with dental pain )  No sepsis  BCx 12/24 S herman    RECOMMENDATIONS:  F/u repeat BCx  FIORELLA  Dental evaluation  Rocephin 2 gm iv q24h

## 2019-12-28 NOTE — PROGRESS NOTE ADULT - SUBJECTIVE AND OBJECTIVE BOX
RAMIN PINTO 69y Male  MRN#: [id number]   CODE STATUS:________      SUBJECTIVE  Patient is a 69y old Male who presents with a chief complaint of Positive blood cx (27 Dec 2019 20:35)  Currently admitted to medicine with the primary diagnosis of Positive blood cultures    Today is hospital day 1d, no overnight complaints    OBJECTIVE  PAST MEDICAL & SURGICAL HISTORY  Hyperlipidemia  HTN (hypertension)  Type 2 diabetes mellitus  CAD (coronary artery disease)  History of appendectomy  S/P CABG x 3: 2001    ALLERGIES:  No Known Allergies    MEDICATIONS:  STANDING MEDICATIONS  aspirin enteric coated 81 milliGRAM(s) Oral daily  cefTRIAXone   IVPB 2000 milliGRAM(s) IV Intermittent every 24 hours  chlorhexidine 4% Liquid 1 Application(s) Topical <User Schedule>  dextrose 5%. 1000 milliLiter(s) IV Continuous <Continuous>  dextrose 50% Injectable 12.5 Gram(s) IV Push once  dextrose 50% Injectable 25 Gram(s) IV Push once  dextrose 50% Injectable 25 Gram(s) IV Push once  heparin  Injectable 5000 Unit(s) SubCutaneous every 12 hours  influenza   Vaccine 0.5 milliLiter(s) IntraMuscular once  insulin glargine Injectable (LANTUS) 28 Unit(s) SubCutaneous at bedtime  lisinopril 20 milliGRAM(s) Oral daily  metoprolol succinate  milliGRAM(s) Oral daily  simvastatin 40 milliGRAM(s) Oral at bedtime    PRN MEDICATIONS  dextrose 40% Gel 15 Gram(s) Oral once PRN  glucagon  Injectable 1 milliGRAM(s) IntraMuscular once PRN      VITAL SIGNS: Last 24 Hours  T(C): 36.5 (28 Dec 2019 05:26), Max: 36.7 (27 Dec 2019 12:28)  T(F): 97.7 (28 Dec 2019 05:26), Max: 98 (27 Dec 2019 12:28)  HR: 79 (28 Dec 2019 05:26) (57 - 79)  BP: 161/73 (28 Dec 2019 05:26) (148/76 - 183/75)  BP(mean): --  RR: 18 (28 Dec 2019 05:26) (16 - 18)  SpO2: 97% (28 Dec 2019 07:39) (96% - 100%)    LABS:                        9.8    7.36  )-----------( 218      ( 28 Dec 2019 07:26 )             30.7     12-28    144  |  105  |  34<H>  ----------------------------<  71  4.3   |  24  |  1.8<H>    Ca    9.3      28 Dec 2019 07:26    TPro  6.6  /  Alb  4.0  /  TBili  0.4  /  DBili  x   /  AST  20  /  ALT  19  /  AlkPhos  67  12-28          Sedimentation Rate, Erythrocyte: 61 mm/Hr <H> (12-27-19 @ 15:10)          RADIOLOGY:      PHYSICAL EXAM:  GENERAL: NAD, well-developed, AAOx3  HEENT:  Atraumatic, Normocephalic. EOMI, PERRLA, conjunctiva and sclera clear, No JVD  PULMONARY: Clear to auscultation bilaterally; No wheeze  CARDIOVASCULAR: Regular rate and rhythm; No murmurs, rubs, or gallops  GASTROINTESTINAL: Soft, Nontender, Nondistended; Bowel sounds present  MUSCULOSKELETAL:  2+ Peripheral Pulses, No clubbing, cyanosis, or edema  NEUROLOGY: non-focal  SKIN: No rashes or lesions

## 2019-12-29 ENCOUNTER — TRANSCRIPTION ENCOUNTER (OUTPATIENT)
Age: 69
End: 2019-12-29

## 2019-12-29 LAB
ALBUMIN SERPL ELPH-MCNC: 3.8 G/DL — SIGNIFICANT CHANGE UP (ref 3.5–5.2)
ALP SERPL-CCNC: 66 U/L — SIGNIFICANT CHANGE UP (ref 30–115)
ALT FLD-CCNC: 19 U/L — SIGNIFICANT CHANGE UP (ref 0–41)
ANION GAP SERPL CALC-SCNC: 12 MMOL/L — SIGNIFICANT CHANGE UP (ref 7–14)
APTT BLD: 28.2 SEC — SIGNIFICANT CHANGE UP (ref 27–39.2)
AST SERPL-CCNC: 19 U/L — SIGNIFICANT CHANGE UP (ref 0–41)
BASOPHILS # BLD AUTO: 0.03 K/UL — SIGNIFICANT CHANGE UP (ref 0–0.2)
BASOPHILS NFR BLD AUTO: 0.4 % — SIGNIFICANT CHANGE UP (ref 0–1)
BILIRUB SERPL-MCNC: 0.3 MG/DL — SIGNIFICANT CHANGE UP (ref 0.2–1.2)
BUN SERPL-MCNC: 39 MG/DL — HIGH (ref 10–20)
CALCIUM SERPL-MCNC: 9.2 MG/DL — SIGNIFICANT CHANGE UP (ref 8.5–10.1)
CHLORIDE SERPL-SCNC: 103 MMOL/L — SIGNIFICANT CHANGE UP (ref 98–110)
CO2 SERPL-SCNC: 25 MMOL/L — SIGNIFICANT CHANGE UP (ref 17–32)
CREAT SERPL-MCNC: 1.6 MG/DL — HIGH (ref 0.7–1.5)
EOSINOPHIL # BLD AUTO: 0.33 K/UL — SIGNIFICANT CHANGE UP (ref 0–0.7)
EOSINOPHIL NFR BLD AUTO: 4.6 % — SIGNIFICANT CHANGE UP (ref 0–8)
GLUCOSE BLDC GLUCOMTR-MCNC: 111 MG/DL — HIGH (ref 70–99)
GLUCOSE BLDC GLUCOMTR-MCNC: 188 MG/DL — HIGH (ref 70–99)
GLUCOSE BLDC GLUCOMTR-MCNC: 319 MG/DL — HIGH (ref 70–99)
GLUCOSE BLDC GLUCOMTR-MCNC: 334 MG/DL — HIGH (ref 70–99)
GLUCOSE BLDC GLUCOMTR-MCNC: 88 MG/DL — SIGNIFICANT CHANGE UP (ref 70–99)
GLUCOSE SERPL-MCNC: 89 MG/DL — SIGNIFICANT CHANGE UP (ref 70–99)
HCT VFR BLD CALC: 29.2 % — LOW (ref 42–52)
HCT VFR BLD CALC: 31.7 % — LOW (ref 42–52)
HCV AB S/CO SERPL IA: 0.09 S/CO — SIGNIFICANT CHANGE UP (ref 0–0.99)
HCV AB SERPL-IMP: SIGNIFICANT CHANGE UP
HGB BLD-MCNC: 10.1 G/DL — LOW (ref 14–18)
HGB BLD-MCNC: 9.1 G/DL — LOW (ref 14–18)
IMM GRANULOCYTES NFR BLD AUTO: 0.4 % — HIGH (ref 0.1–0.3)
INR BLD: 1.07 RATIO — SIGNIFICANT CHANGE UP (ref 0.65–1.3)
LYMPHOCYTES # BLD AUTO: 1.52 K/UL — SIGNIFICANT CHANGE UP (ref 1.2–3.4)
LYMPHOCYTES # BLD AUTO: 21 % — SIGNIFICANT CHANGE UP (ref 20.5–51.1)
MCHC RBC-ENTMCNC: 27.9 PG — SIGNIFICANT CHANGE UP (ref 27–31)
MCHC RBC-ENTMCNC: 28.5 PG — SIGNIFICANT CHANGE UP (ref 27–31)
MCHC RBC-ENTMCNC: 31.2 G/DL — LOW (ref 32–37)
MCHC RBC-ENTMCNC: 31.9 G/DL — LOW (ref 32–37)
MCV RBC AUTO: 89.5 FL — SIGNIFICANT CHANGE UP (ref 80–94)
MCV RBC AUTO: 89.6 FL — SIGNIFICANT CHANGE UP (ref 80–94)
MONOCYTES # BLD AUTO: 0.86 K/UL — HIGH (ref 0.1–0.6)
MONOCYTES NFR BLD AUTO: 11.9 % — HIGH (ref 1.7–9.3)
NEUTROPHILS # BLD AUTO: 4.48 K/UL — SIGNIFICANT CHANGE UP (ref 1.4–6.5)
NEUTROPHILS NFR BLD AUTO: 61.7 % — SIGNIFICANT CHANGE UP (ref 42.2–75.2)
NRBC # BLD: 0 /100 WBCS — SIGNIFICANT CHANGE UP (ref 0–0)
NRBC # BLD: 0 /100 WBCS — SIGNIFICANT CHANGE UP (ref 0–0)
PLATELET # BLD AUTO: 208 K/UL — SIGNIFICANT CHANGE UP (ref 130–400)
PLATELET # BLD AUTO: 236 K/UL — SIGNIFICANT CHANGE UP (ref 130–400)
POTASSIUM SERPL-MCNC: 4.8 MMOL/L — SIGNIFICANT CHANGE UP (ref 3.5–5)
POTASSIUM SERPL-SCNC: 4.8 MMOL/L — SIGNIFICANT CHANGE UP (ref 3.5–5)
PROT SERPL-MCNC: 6.5 G/DL — SIGNIFICANT CHANGE UP (ref 6–8)
PROTHROM AB SERPL-ACNC: 12.3 SEC — SIGNIFICANT CHANGE UP (ref 9.95–12.87)
RBC # BLD: 3.26 M/UL — LOW (ref 4.7–6.1)
RBC # BLD: 3.54 M/UL — LOW (ref 4.7–6.1)
RBC # FLD: 11.6 % — SIGNIFICANT CHANGE UP (ref 11.5–14.5)
RBC # FLD: 11.6 % — SIGNIFICANT CHANGE UP (ref 11.5–14.5)
SODIUM SERPL-SCNC: 140 MMOL/L — SIGNIFICANT CHANGE UP (ref 135–146)
VIT D25+D1,25 OH+D1,25 PNL SERPL-MCNC: 30.1 PG/ML — SIGNIFICANT CHANGE UP (ref 19.9–79.3)
WBC # BLD: 7.25 K/UL — SIGNIFICANT CHANGE UP (ref 4.8–10.8)
WBC # BLD: 7.38 K/UL — SIGNIFICANT CHANGE UP (ref 4.8–10.8)
WBC # FLD AUTO: 7.25 K/UL — SIGNIFICANT CHANGE UP (ref 4.8–10.8)
WBC # FLD AUTO: 7.38 K/UL — SIGNIFICANT CHANGE UP (ref 4.8–10.8)

## 2019-12-29 PROCEDURE — 99232 SBSQ HOSP IP/OBS MODERATE 35: CPT

## 2019-12-29 PROCEDURE — 99233 SBSQ HOSP IP/OBS HIGH 50: CPT

## 2019-12-29 RX ADMIN — Medication 1: at 13:29

## 2019-12-29 RX ADMIN — Medication 5 UNIT(S): at 13:24

## 2019-12-29 RX ADMIN — Medication 100 MILLIGRAM(S): at 05:57

## 2019-12-29 RX ADMIN — LISINOPRIL 20 MILLIGRAM(S): 2.5 TABLET ORAL at 05:57

## 2019-12-29 RX ADMIN — Medication 5 UNIT(S): at 08:31

## 2019-12-29 RX ADMIN — SIMVASTATIN 40 MILLIGRAM(S): 20 TABLET, FILM COATED ORAL at 21:22

## 2019-12-29 RX ADMIN — Medication 4: at 17:07

## 2019-12-29 RX ADMIN — CEFTRIAXONE 100 MILLIGRAM(S): 500 INJECTION, POWDER, FOR SOLUTION INTRAMUSCULAR; INTRAVENOUS at 21:22

## 2019-12-29 RX ADMIN — Medication 5 UNIT(S): at 17:07

## 2019-12-29 RX ADMIN — Medication 81 MILLIGRAM(S): at 13:37

## 2019-12-29 RX ADMIN — INSULIN GLARGINE 28 UNIT(S): 100 INJECTION, SOLUTION SUBCUTANEOUS at 21:22

## 2019-12-29 NOTE — PROGRESS NOTE ADULT - ASSESSMENT
69 year old male with PMH of HTN, DM II, DLD, CAD s/p CABG (2001) recently presented to ED for viral gastroenteritis, at that visit (12/25/19) blood cultures were drawn positive for -  Streptococcus sp. (Not Grp A, B or S pneumoniae): Detec (12.24.19 @ 23:04), patient was called back  for further work up.     # Positive blood cultures with Strep Anginus and possible Aortic valve vegetation  -per ID never a contaminant  -blood cultures are repeated  -per ID started c/w Rocephin  -2D Echo- possible aortic valve vegetation  -cardiology following the case would like to rule out vegetation with FIORELLA tomorrow  -npo @mdn  -pre-op labs at 8pm    # CKD likely secondary to DM  -Cr 1.6 (unknown baseline), stable and downtrending  -Monitor BMP    # DM II  -Continue with Insulin regimen.   -Monitor Fingerstick glucose    # HTN   - Continue Toprol and Lisinopril    # DLD   - Continue Simvastatin    # CAD s/p CABG   - Continue ASA    Activity: IAT  Diet: DASH/TLC, npo after mdn  GI/DVT: no protonix, on hep  Dispo: FIORELLA tomorrow  Code: full 69 year old male with PMH of HTN, DM II, DLD, CAD s/p CABG (2001) recently presented to ED for viral gastroenteritis, at that visit (12/25/19) blood cultures were drawn positive for -  Streptococcus sp. (Not Grp A, B or S pneumoniae): Detec (12.24.19 @ 23:04), patient was called back  for further work up.     # Positive blood cultures with Strep Anginus and possible Aortic valve vegetation  -per ID never a contaminant  -blood cultures are repeated  -per ID started c/w Rocephin  -2D Echo- possible aortic valve vegetation  -cardiology following the case would like to rule out vegetation with FIORELLA tomorrow  -npo @mdn  -pre-op labs at 8pm  -dental clinic appt tentatively norma 1 pm    # CKD likely secondary to DM  -Cr 1.6 (unknown baseline), stable and downtrending  -Monitor BMP    # DM II  -Continue with Insulin regimen.   -Monitor Fingerstick glucose    # HTN   - Continue Toprol and Lisinopril    # DLD   - Continue Simvastatin    # CAD s/p CABG   - Continue ASA    Activity: IAT  Diet: DASH/TLC, npo after mdn  GI/DVT: no protonix, on hep  Dispo: FIORELLA tomorrow  Code: full

## 2019-12-29 NOTE — DISCHARGE NOTE NURSING/CASE MANAGEMENT/SOCIAL WORK - PATIENT PORTAL LINK FT
You can access the FollowMyHealth Patient Portal offered by [facility] by registering at the following website: [email or web address]. By joining [employer]’s FollowMyHealth portal, you will also be able to view your health information using other applications (apps) compatible with our system.

## 2019-12-29 NOTE — PROGRESS NOTE ADULT - ASSESSMENT
· Assessment		   69y old  Male who presents with a chief complaint of chills. Found  to have positive blood cultures, called back for re-admission.     IMPRESSION:  Strep anginosus bacteremia secondary to possible AV endocarditis/ dental origin  Dental origin ( which is more probable as the pt was symptomatic 1 day prior to the onset of acute chills with dental pain )  ECHO with ossible AV vegetation  No sepsis  BCx 12/24 S anginosus  BCx 12/27 NG    RECOMMENDATIONS:  FIORELLA 12/30  Dental evaluation  Rocephin 2 gm iv q24h  . Duration will be based on above results but will be at least 14 d iv.   Could proceed with midline

## 2019-12-29 NOTE — PROGRESS NOTE ADULT - SUBJECTIVE AND OBJECTIVE BOX
RAMIN PINTO  69y, Male    All available historical data reviewed    OVERNIGHT EVENTS:  none    ROS:  General: Denies rigors, nightsweats  HEENT: Denies headache, rhinorrhea, sore throat, eye pain  CV: Denies CP, palpitations  PULM: Denies wheezing, hemoptysis  GI: Denies hematemesis, hematochezia, melena  : Denies discharge, hematuria  MSK: Denies arthralgias, myalgias  SKIN: Denies rash, lesions  NEURO: Denies paresthesias, weakness  PSYCH: Denies depression, anxiety    VITALS:  T(F): 96, Max: 98.8 (12-28-19 @ 20:55)  HR: 96  BP: 146/67  RR: 18Vital Signs Last 24 Hrs  T(C): 35.6 (29 Dec 2019 05:47), Max: 37.1 (28 Dec 2019 12:54)  T(F): 96 (29 Dec 2019 05:47), Max: 98.8 (28 Dec 2019 20:55)  HR: 96 (29 Dec 2019 05:47) (61 - 96)  BP: 146/67 (29 Dec 2019 05:47) (146/67 - 179/77)  BP(mean): --  RR: 18 (29 Dec 2019 05:47) (18 - 18)  SpO2: 98% (28 Dec 2019 20:00) (98% - 98%)    TESTS & MEASUREMENTS:                        10.1   7.38  )-----------( 236      ( 29 Dec 2019 07:57 )             31.7     12-29    140  |  103  |  39<H>  ----------------------------<  89  4.8   |  25  |  1.6<H>    Ca    9.2      29 Dec 2019 07:57    TPro  6.5  /  Alb  3.8  /  TBili  0.3  /  DBili  x   /  AST  19  /  ALT  19  /  AlkPhos  66  12-29    LIVER FUNCTIONS - ( 29 Dec 2019 07:57 )  Alb: 3.8 g/dL / Pro: 6.5 g/dL / ALK PHOS: 66 U/L / ALT: 19 U/L / AST: 19 U/L / GGT: x             Culture - Blood (collected 12-27-19 @ 15:13)  Source: .Blood Blood-Peripheral  Preliminary Report (12-29-19 @ 01:01):    No growth to date.    Culture - Blood (collected 12-27-19 @ 15:13)  Source: .Blood Blood-Peripheral  Preliminary Report (12-29-19 @ 01:01):    No growth to date.    Culture - Urine (collected 12-25-19 @ 00:00)  Source: .Urine Clean Catch (Midstream)  Final Report (12-26-19 @ 06:38):    No growth    Culture - Blood (collected 12-24-19 @ 23:04)  Source: .Blood Blood-Venous  Gram Stain (12-26-19 @ 02:53):    Growth in anaerobic bottle: Gram Positive Cocci in Pairs and Chains  Final Report (12-26-19 @ 23:59):    Growth in anaerobic bottle: Streptococcus anginosus "Susceptibilities not    performed"    "Due to technical problems, Proteus sp. will Not be reported as part of    the BCID panel until further notice"    ***Blood Panel PCR results on this specimen are available    approximately 3 hours after the Gram stain result.***    Gram stain, PCR, and/or culture results may not always    correspond due to difference in methodologies.    ************************************************************    This PCR assay was performed using [employer].    The following targets are tested for: Enterococcus,    vancomycin resistant enterococci, Listeria monocytogenes,    coagulase negative staphylococci, S. aureus,    methicillin resistant S. aureus, Streptococcus agalactiae    (Group B), S. pneumoniae, S. pyogenes (Group A),    Acinetobacter baumannii, Enterobacter cloacae, E. coli,    Klebsiella oxytoca, K. pneumoniae, Proteus sp.,    Serratia marcescens, Haemophilus influenzae,    Neisseria meningitidis, Pseudomonas aeruginosa, Candida    albicans, C. glabrata, C krusei, C parapsilosis,    C. tropicalis and the KPC resistance gene.  Organism: Blood Culture PCR (12-26-19 @ 23:59)  Organism: Blood Culture PCR (12-26-19 @ 23:59)      -  Streptococcus sp. (Not Grp A, B or S pneumoniae): Detec      Method Type: PCR    Culture - Blood (collected 12-24-19 @ 23:04)  Source: .Blood Blood-Venous  Gram Stain (12-26-19 @ 09:51):    Growth in anaerobic bottle: Gram Positive Cocci in Pairs and Chains  Final Report (12-28-19 @ 16:31):    Growth in anaerobic bottle: Streptococcus anginosus  Organism: Streptococcus anginosus  Streptococcus anginosus (12-28-19 @ 16:31)  Organism: Streptococcus anginosus (12-28-19 @ 16:31)      -  Clindamycin: S      -  Erythromycin: S      -  Levofloxacin: S      -  Vancomycin: S      Method Type: KB  Organism: Streptococcus anginosus (12-28-19 @ 16:31)      -  Ceftriaxone: S 0.5      -  Penicillin: S 0.125      Method Type: ETEST            RADIOLOGY & ADDITIONAL TESTS:  Personal review of radiological diagnostics performed  Echo and EKG results noted when applicable.     ANTIBIOTICS:  cefTRIAXone   IVPB 2000 milliGRAM(s) IV Intermittent every 24 hours

## 2019-12-29 NOTE — PROGRESS NOTE ADULT - SUBJECTIVE AND OBJECTIVE BOX
Hospital Day:  2d    Subjective:    Patient is a 69y old  Male who presents with a chief complaint of Positive blood cx (27 Dec 2019 20:35)    Interval: No acute events overnight. Patient reports feeling well this am, without complaints. Possible vegetation seen on aortic valve, blood culture positive for Streptococcus anginosus. Will go for FIORELLA tomorrow, Npo @ mdn.     Past Medical Hx:   Hyperlipidemia  HTN (hypertension)  Type 2 diabetes mellitus  CAD (coronary artery disease)    Past Sx:  History of appendectomy  S/P CABG x 3    Allergies:  No Known Allergies    Current Meds:   Standng Meds:  aspirin enteric coated 81 milliGRAM(s) Oral daily  cefTRIAXone   IVPB 2000 milliGRAM(s) IV Intermittent every 24 hours  chlorhexidine 4% Liquid 1 Application(s) Topical <User Schedule>  dextrose 5%. 1000 milliLiter(s) (50 mL/Hr) IV Continuous <Continuous>  dextrose 50% Injectable 12.5 Gram(s) IV Push once  dextrose 50% Injectable 25 Gram(s) IV Push once  dextrose 50% Injectable 25 Gram(s) IV Push once  heparin  Injectable 5000 Unit(s) SubCutaneous every 12 hours  influenza   Vaccine 0.5 milliLiter(s) IntraMuscular once  insulin glargine Injectable (LANTUS) 28 Unit(s) SubCutaneous at bedtime  insulin lispro (HumaLOG) corrective regimen sliding scale   SubCutaneous three times a day before meals  insulin lispro Injectable (HumaLOG) 5 Unit(s) SubCutaneous three times a day before meals  lisinopril 20 milliGRAM(s) Oral daily  metoprolol succinate  milliGRAM(s) Oral daily  simvastatin 40 milliGRAM(s) Oral at bedtime    PRN Meds:  dextrose 40% Gel 15 Gram(s) Oral once PRN Blood Glucose LESS THAN 70 milliGRAM(s)/deciliter  glucagon  Injectable 1 milliGRAM(s) IntraMuscular once PRN Glucose LESS THAN 70 milligrams/deciliter    HOME MEDICATIONS:  Aspir 81 oral delayed release tablet: 1 tab(s) orally once a day  lisinopril 20 mg oral tablet: 1 tab(s) orally once a day  metoprolol succinate 100 mg oral tablet, extended release: 1 tab(s) orally once a day  NovoLOG FlexPen 100 units/mL injectable solution: 5 unit(s) injectable 2 times a day (with meals), As Needed  simvastatin 40 mg oral tablet: 1 tab(s) orally once a day (at bedtime)  Tresiba 100 units/mL subcutaneous solution: 28 unit(s) subcutaneous once a day (at bedtime)      Vital Signs:   T(F): 96 (12-29-19 @ 05:47), Max: 98.8 (12-28-19 @ 20:55)  HR: 96 (12-29-19 @ 05:47) (61 - 96)  BP: 146/67 (12-29-19 @ 05:47) (146/67 - 179/77)  RR: 18 (12-29-19 @ 05:47) (18 - 18)  SpO2: 98% (12-28-19 @ 20:00) (98% - 98%)        Physical Exam:   GENERAL: NAD  HEENT: NCAT  CHEST/LUNG: CTAB  HEART: Regular rate and rhythm; s1 s2 appreciated, No murmurs, rubs, or gallops  ABDOMEN: Soft, Nontender, Nondistended; Bowel sounds present  EXTREMITIES: No LE edema b/l  NERVOUS SYSTEM:  Alert & Oriented X3        Labs:                         10.1   7.38  )-----------( 236      ( 29 Dec 2019 07:57 )             31.7       29 Dec 2019 07:57    140    |  103    |  39     ----------------------------<  89     4.8     |  25     |  1.6      Ca    9.2        29 Dec 2019 07:57    TPro  6.5    /  Alb  3.8    /  TBili  0.3    /  DBili  x      /  AST  19     /  ALT  19     /  AlkPhos  66     29 Dec 2019 07:57        Amylase --, Lipase 43, 12-24-19 @ 23:04    Hemoglobin A1C, Whole Blood: 8.0 % (12-25-19 @ 05:55)        Iron 74, TIBC 222, %Sat 33 Ferritin 309 12-28-19 @ 07:26            Culture - Blood (collected 12-27-19 @ 15:13)  Source: .Blood Blood-Peripheral  Preliminary Report (12-29-19 @ 01:01):    No growth to date.    Culture - Blood (collected 12-27-19 @ 15:13)  Source: .Blood Blood-Peripheral  Preliminary Report (12-29-19 @ 01:01):    No growth to date.    Culture - Blood (collected 12-24-19 @ 23:04)  Source: .Blood Blood-Venous  Gram Stain (12-26-19 @ 02:53):    Growth in anaerobic bottle: Gram Positive Cocci in Pairs and Chains  Final Report (12-26-19 @ 23:59):    Growth in anaerobic bottle: Streptococcus anginosus "Susceptibilities not    performed"    "Due to technical problems, Proteus sp. will Not be reported as part of    the BCID panel until further notice"    ***Blood Panel PCR results on this specimen are available    approximately 3 hours after the Gram stain result.***    Gram stain, PCR, and/or culture results may not always    correspond due to difference in methodologies.    ************************************************************    This PCR assay was performed using [employer].    The following targets are tested for: Enterococcus,    vancomycin resistant enterococci, Listeria monocytogenes,    coagulase negative staphylococci, S. aureus,    methicillin resistant S. aureus, Streptococcus agalactiae    (Group B), S. pneumoniae, S. pyogenes (Group A),    Acinetobacter baumannii, Enterobacter cloacae, E. coli,    Klebsiella oxytoca, K. pneumoniae, Proteus sp.,    Serratia marcescens, Haemophilus influenzae,    Neisseria meningitidis, Pseudomonas aeruginosa, Candida    albicans, C. glabrata, C krusei, C parapsilosis,    C. tropicalis and the KPC resistance gene.  Organism: Blood Culture PCR (12-26-19 @ 23:59)  Organism: Blood Culture PCR (12-26-19 @ 23:59)      -  Streptococcus sp. (Not Grp A, B or S pneumoniae): Detec      Method Type: PCR    Culture - Blood (collected 12-24-19 @ 23:04)  Source: .Blood Blood-Venous  Gram Stain (12-26-19 @ 09:51):    Growth in anaerobic bottle: Gram Positive Cocci in Pairs and Chains  Final Report (12-28-19 @ 16:31):    Growth in anaerobic bottle: Streptococcus anginosus  Organism: Streptococcus anginosus  Streptococcus anginosus (12-28-19 @ 16:31)  Organism: Streptococcus anginosus (12-28-19 @ 16:31)      -  Clindamycin: S      -  Erythromycin: S      -  Levofloxacin: S      -  Vancomycin: S      Method Type:   Organism: Streptococcus anginosus (12-28-19 @ 16:31)      -  Ceftriaxone: S 0.5      -  Penicillin: S 0.125      Method Type: ETEST    Radiology:   < from: Transthoracic Echocardiogram (12.27.19 @ 15:12) >  Summary:   1. Normal global left ventricular systolic function.   2. Mild mitral annular calcification.   3. Mild aortic regurgitation.   4. Cant exclude small vegetaion on AV.    PHYSICIAN INTERPRETATION:  Left Ventricle: The left ventricular internal cavity size is normal. Left ventricular wall thickness is normal. Global LV systolic function was normal. Normal segmental left ventricular systolic function.  Right Ventricle: Normal right ventricular size and function.  Left Atrium: Mild to moderately enlarged left atrium.  Right Atrium: Normal right atrial size.  Pericardium: There is no evidence of pericardial effusion.  Mitral Valve: The mitral valve is normal in structure. There is mild mitral annular calcification. Mild mitral valve regurgitation is seen.  Tricuspid Valve: The tricuspid valve is normal in structure. Trivial tricuspid regurgitation is visualized.  Aortic Valve: No evidence of aortic stenosis. Mild aortic valve regurgitation is seen. Cant exclude small vegetaion on AV.  Pulmonic Valve: Mild pulmonic valve regurgitation.  Aorta: The aortic root is normal in size and structure.    < end of copied text > Hospital Day:  2d    Subjective:    Patient is a 69y old  Male who presents with a chief complaint of Positive blood cx (27 Dec 2019 20:35)    Interval: No acute events overnight. Patient reports feeling well this am, without complaints. Possible vegetation seen on aortic valve, blood culture positive for Streptococcus anginosus. Will go for FIORELLA tomorrow, Npo @ mdn. Also has dental clinic appt, tentatively for 1 pm tomorrow    Past Medical Hx:   Hyperlipidemia  HTN (hypertension)  Type 2 diabetes mellitus  CAD (coronary artery disease)    Past Sx:  History of appendectomy  S/P CABG x 3    Allergies:  No Known Allergies    Current Meds:   Standng Meds:  aspirin enteric coated 81 milliGRAM(s) Oral daily  cefTRIAXone   IVPB 2000 milliGRAM(s) IV Intermittent every 24 hours  chlorhexidine 4% Liquid 1 Application(s) Topical <User Schedule>  dextrose 5%. 1000 milliLiter(s) (50 mL/Hr) IV Continuous <Continuous>  dextrose 50% Injectable 12.5 Gram(s) IV Push once  dextrose 50% Injectable 25 Gram(s) IV Push once  dextrose 50% Injectable 25 Gram(s) IV Push once  heparin  Injectable 5000 Unit(s) SubCutaneous every 12 hours  influenza   Vaccine 0.5 milliLiter(s) IntraMuscular once  insulin glargine Injectable (LANTUS) 28 Unit(s) SubCutaneous at bedtime  insulin lispro (HumaLOG) corrective regimen sliding scale   SubCutaneous three times a day before meals  insulin lispro Injectable (HumaLOG) 5 Unit(s) SubCutaneous three times a day before meals  lisinopril 20 milliGRAM(s) Oral daily  metoprolol succinate  milliGRAM(s) Oral daily  simvastatin 40 milliGRAM(s) Oral at bedtime    PRN Meds:  dextrose 40% Gel 15 Gram(s) Oral once PRN Blood Glucose LESS THAN 70 milliGRAM(s)/deciliter  glucagon  Injectable 1 milliGRAM(s) IntraMuscular once PRN Glucose LESS THAN 70 milligrams/deciliter    HOME MEDICATIONS:  Aspir 81 oral delayed release tablet: 1 tab(s) orally once a day  lisinopril 20 mg oral tablet: 1 tab(s) orally once a day  metoprolol succinate 100 mg oral tablet, extended release: 1 tab(s) orally once a day  NovoLOG FlexPen 100 units/mL injectable solution: 5 unit(s) injectable 2 times a day (with meals), As Needed  simvastatin 40 mg oral tablet: 1 tab(s) orally once a day (at bedtime)  Tresiba 100 units/mL subcutaneous solution: 28 unit(s) subcutaneous once a day (at bedtime)      Vital Signs:   T(F): 96 (12-29-19 @ 05:47), Max: 98.8 (12-28-19 @ 20:55)  HR: 96 (12-29-19 @ 05:47) (61 - 96)  BP: 146/67 (12-29-19 @ 05:47) (146/67 - 179/77)  RR: 18 (12-29-19 @ 05:47) (18 - 18)  SpO2: 98% (12-28-19 @ 20:00) (98% - 98%)        Physical Exam:   GENERAL: NAD  HEENT: NCAT  CHEST/LUNG: CTAB  HEART: Regular rate and rhythm; s1 s2 appreciated, No murmurs, rubs, or gallops  ABDOMEN: Soft, Nontender, Nondistended; Bowel sounds present  EXTREMITIES: No LE edema b/l  NERVOUS SYSTEM:  Alert & Oriented X3        Labs:                         10.1   7.38  )-----------( 236      ( 29 Dec 2019 07:57 )             31.7       29 Dec 2019 07:57    140    |  103    |  39     ----------------------------<  89     4.8     |  25     |  1.6      Ca    9.2        29 Dec 2019 07:57    TPro  6.5    /  Alb  3.8    /  TBili  0.3    /  DBili  x      /  AST  19     /  ALT  19     /  AlkPhos  66     29 Dec 2019 07:57        Amylase --, Lipase 43, 12-24-19 @ 23:04    Hemoglobin A1C, Whole Blood: 8.0 % (12-25-19 @ 05:55)        Iron 74, TIBC 222, %Sat 33 Ferritin 309 12-28-19 @ 07:26            Culture - Blood (collected 12-27-19 @ 15:13)  Source: .Blood Blood-Peripheral  Preliminary Report (12-29-19 @ 01:01):    No growth to date.    Culture - Blood (collected 12-27-19 @ 15:13)  Source: .Blood Blood-Peripheral  Preliminary Report (12-29-19 @ 01:01):    No growth to date.    Culture - Blood (collected 12-24-19 @ 23:04)  Source: .Blood Blood-Venous  Gram Stain (12-26-19 @ 02:53):    Growth in anaerobic bottle: Gram Positive Cocci in Pairs and Chains  Final Report (12-26-19 @ 23:59):    Growth in anaerobic bottle: Streptococcus anginosus "Susceptibilities not    performed"    "Due to technical problems, Proteus sp. will Not be reported as part of    the BCID panel until further notice"    ***Blood Panel PCR results on this specimen are available    approximately 3 hours after the Gram stain result.***    Gram stain, PCR, and/or culture results may not always    correspond due to difference in methodologies.    ************************************************************    This PCR assay was performed using [employer].    The following targets are tested for: Enterococcus,    vancomycin resistant enterococci, Listeria monocytogenes,    coagulase negative staphylococci, S. aureus,    methicillin resistant S. aureus, Streptococcus agalactiae    (Group B), S. pneumoniae, S. pyogenes (Group A),    Acinetobacter baumannii, Enterobacter cloacae, E. coli,    Klebsiella oxytoca, K. pneumoniae, Proteus sp.,    Serratia marcescens, Haemophilus influenzae,    Neisseria meningitidis, Pseudomonas aeruginosa, Candida    albicans, C. glabrata, C krusei, C parapsilosis,    C. tropicalis and the KPC resistance gene.  Organism: Blood Culture PCR (12-26-19 @ 23:59)  Organism: Blood Culture PCR (12-26-19 @ 23:59)      -  Streptococcus sp. (Not Grp A, B or S pneumoniae): Detec      Method Type: PCR    Culture - Blood (collected 12-24-19 @ 23:04)  Source: .Blood Blood-Venous  Gram Stain (12-26-19 @ 09:51):    Growth in anaerobic bottle: Gram Positive Cocci in Pairs and Chains  Final Report (12-28-19 @ 16:31):    Growth in anaerobic bottle: Streptococcus anginosus  Organism: Streptococcus anginosus  Streptococcus anginosus (12-28-19 @ 16:31)  Organism: Streptococcus anginosus (12-28-19 @ 16:31)      -  Clindamycin: S      -  Erythromycin: S      -  Levofloxacin: S      -  Vancomycin: S      Method Type:   Organism: Streptococcus anginosus (12-28-19 @ 16:31)      -  Ceftriaxone: S 0.5      -  Penicillin: S 0.125      Method Type: ETEST    Radiology:   < from: Transthoracic Echocardiogram (12.27.19 @ 15:12) >  Summary:   1. Normal global left ventricular systolic function.   2. Mild mitral annular calcification.   3. Mild aortic regurgitation.   4. Cant exclude small vegetaion on AV.    PHYSICIAN INTERPRETATION:  Left Ventricle: The left ventricular internal cavity size is normal. Left ventricular wall thickness is normal. Global LV systolic function was normal. Normal segmental left ventricular systolic function.  Right Ventricle: Normal right ventricular size and function.  Left Atrium: Mild to moderately enlarged left atrium.  Right Atrium: Normal right atrial size.  Pericardium: There is no evidence of pericardial effusion.  Mitral Valve: The mitral valve is normal in structure. There is mild mitral annular calcification. Mild mitral valve regurgitation is seen.  Tricuspid Valve: The tricuspid valve is normal in structure. Trivial tricuspid regurgitation is visualized.  Aortic Valve: No evidence of aortic stenosis. Mild aortic valve regurgitation is seen. Cant exclude small vegetaion on AV.  Pulmonic Valve: Mild pulmonic valve regurgitation.  Aorta: The aortic root is normal in size and structure.    < end of copied text >

## 2019-12-29 NOTE — PROGRESS NOTE ADULT - SUBJECTIVE AND OBJECTIVE BOX
SUBJ:      MEDICATIONS  (STANDING):  aspirin enteric coated 81 milliGRAM(s) Oral daily  cefTRIAXone   IVPB 2000 milliGRAM(s) IV Intermittent every 24 hours  chlorhexidine 4% Liquid 1 Application(s) Topical <User Schedule>  dextrose 5%. 1000 milliLiter(s) (50 mL/Hr) IV Continuous <Continuous>  dextrose 50% Injectable 12.5 Gram(s) IV Push once  dextrose 50% Injectable 25 Gram(s) IV Push once  dextrose 50% Injectable 25 Gram(s) IV Push once  heparin  Injectable 5000 Unit(s) SubCutaneous every 12 hours  influenza   Vaccine 0.5 milliLiter(s) IntraMuscular once  insulin glargine Injectable (LANTUS) 28 Unit(s) SubCutaneous at bedtime  insulin lispro (HumaLOG) corrective regimen sliding scale   SubCutaneous three times a day before meals  insulin lispro Injectable (HumaLOG) 5 Unit(s) SubCutaneous three times a day before meals  lisinopril 20 milliGRAM(s) Oral daily  metoprolol succinate  milliGRAM(s) Oral daily  simvastatin 40 milliGRAM(s) Oral at bedtime    MEDICATIONS  (PRN):  dextrose 40% Gel 15 Gram(s) Oral once PRN Blood Glucose LESS THAN 70 milliGRAM(s)/deciliter  glucagon  Injectable 1 milliGRAM(s) IntraMuscular once PRN Glucose LESS THAN 70 milligrams/deciliter            Vital Signs Last 24 Hrs  T(C): 35.6 (29 Dec 2019 05:47), Max: 37.1 (28 Dec 2019 12:54)  T(F): 96 (29 Dec 2019 05:47), Max: 98.8 (28 Dec 2019 20:55)  HR: 96 (29 Dec 2019 05:47) (61 - 96)  BP: 146/67 (29 Dec 2019 05:47) (146/67 - 179/77)  BP(mean): --  RR: 18 (29 Dec 2019 05:47) (18 - 18)  SpO2: 98% (28 Dec 2019 20:00) (98% - 98%)          PHYSICAL EXAM:  · CONSTITUTIONAL:	Well-developed, well nourished    BMI-  ·RESPIRATORY:   airway patent; breath sounds equal; good air movement; respirations non-labored; clear to auscultation bilaterally; no chest wall tenderness; no intercostal retractions; no rales,rhonchi or wheeze  · CARDIOVASCULAR	regular rate and rhythm  no rub  no murmur  normal PMI  · EXTREMITIES: No cyanosis, clubbing or edema  · VASCULAR: 	Equal and normal pulses (carotid, femoral, dorsalis pedis)  	  TELEMETRY:    ECG:    TTE:    LABS:                        9.8    7.36  )-----------( 218      ( 28 Dec 2019 07:26 )             30.7     12-28    144  |  105  |  34<H>  ----------------------------<  71  4.3   |  24  |  1.8<H>    Ca    9.3      28 Dec 2019 07:26    TPro  6.6  /  Alb  4.0  /  TBili  0.4  /  DBili  x   /  AST  20  /  ALT  19  /  AlkPhos  67  12-28            I&O's Summary    BNP  RADIOLOGY & ADDITIONAL STUDIES:    IMPRESSION AND PLAN:  s/p CABG past  NL EF Mild AI past  + strep blood cult     Rec  antibiotics  FIORELLA Monday  NPO after Midnight

## 2019-12-29 NOTE — CONSULT NOTE ADULT - SUBJECTIVE AND OBJECTIVE BOX
Patient is a 69y old  Male who presents with a chief complaint of Positive blood cx (27 Dec 2019 20:35)      HPI:  69 year old male with PMH of HTN, DM II, DLD, CAD s/p CABG (2001) presented to the ED because he was called for IV antibiotics as blood cultures came back positive. As per the patient on december 24th night he started complaining of chills, nausea, vomiting and body aches which came out all of a sudden. He was admitted to the hospital, received one time dose of rocephin 1g and later started to improve. He was able to sleep well, retain his food with out throwing up.   He denies any recurrence of symptoms, feels totally fine. He says he never had these kind of symptoms before, did not have any recent procedure, dental evaluation done, but complained of tooth pain which has resolved. He had colonoscopy done roughly 5-6 years ago which showed diverticulosis. (27 Dec 2019 13:55)      PAST MEDICAL & SURGICAL HISTORY:  Hyperlipidemia  HTN (hypertension)  Type 2 diabetes mellitus  CAD (coronary artery disease)  History of appendectomy  S/P CABG x 3: 2001    (   ) heart valve replacement  (   ) joint replacement  (   ) pregnancy    MEDICATIONS  (STANDING):  aspirin enteric coated 81 milliGRAM(s) Oral daily  cefTRIAXone   IVPB 2000 milliGRAM(s) IV Intermittent every 24 hours  chlorhexidine 4% Liquid 1 Application(s) Topical <User Schedule>  dextrose 5%. 1000 milliLiter(s) (50 mL/Hr) IV Continuous <Continuous>  dextrose 50% Injectable 12.5 Gram(s) IV Push once  dextrose 50% Injectable 25 Gram(s) IV Push once  dextrose 50% Injectable 25 Gram(s) IV Push once  heparin  Injectable 5000 Unit(s) SubCutaneous every 12 hours  influenza   Vaccine 0.5 milliLiter(s) IntraMuscular once  insulin glargine Injectable (LANTUS) 28 Unit(s) SubCutaneous at bedtime  insulin lispro (HumaLOG) corrective regimen sliding scale   SubCutaneous three times a day before meals  insulin lispro Injectable (HumaLOG) 5 Unit(s) SubCutaneous three times a day before meals  lisinopril 20 milliGRAM(s) Oral daily  metoprolol succinate  milliGRAM(s) Oral daily  simvastatin 40 milliGRAM(s) Oral at bedtime    MEDICATIONS  (PRN):  dextrose 40% Gel 15 Gram(s) Oral once PRN Blood Glucose LESS THAN 70 milliGRAM(s)/deciliter  glucagon  Injectable 1 milliGRAM(s) IntraMuscular once PRN Glucose LESS THAN 70 milligrams/deciliter      Allergies    No Known Allergies    Vital Signs Last 24 Hrs  T(C): 35.6 (29 Dec 2019 05:47), Max: 37.1 (28 Dec 2019 12:54)  T(F): 96 (29 Dec 2019 05:47), Max: 98.8 (28 Dec 2019 20:55)  HR: 96 (29 Dec 2019 05:47) (61 - 96)  BP: 146/67 (29 Dec 2019 05:47) (146/67 - 179/77)  BP(mean): --  RR: 18 (29 Dec 2019 05:47) (18 - 18)  SpO2: 98% (28 Dec 2019 20:00) (98% - 98%)    LABS:                        10.1   7.38  )-----------( 236      ( 29 Dec 2019 07:57 )             31.7     12-29    140  |  103  |  39<H>  ----------------------------<  89  4.8   |  25  |  1.6<H>    Ca    9.2      29 Dec 2019 07:57    TPro  6.5  /  Alb  3.8  /  TBili  0.3  /  DBili  x   /  AST  19  /  ALT  19  /  AlkPhos  66  12-29    WBC Count: 7.38 K/uL [4.80 - 10.80] (12-29 @ 07:57)  Platelet Count - Automated: 236 K/uL [130 - 400] (12-29 @ 07:57)  Platelet Count - Automated: 218 K/uL [130 - 400] (12-28 @ 07:26)  WBC Count: 7.36 K/uL [4.80 - 10.80] (12-28 @ 07:26)  Culture Results:   No growth to date. (12-27 @ 15:13)  Culture Results:   No growth to date. (12-27 @ 15:13)  Platelet Count - Automated: 250 K/uL [130 - 400] (12-27 @ 15:10)  WBC Count: 9.15 K/uL [4.80 - 10.80] (12-27 @ 15:10)        EOE:  WNL    IOE:  Slightly firm swelling approximately 3mm diameter near buccal vestibule in area of teeth #26 and #27. Dentition is heavily worn. Patient states he had pain in this area on 12/24/19, but pain has since gotten better unless the area is palpated.    *DENTAL RADIOGRAPHS: no imaging available at this time    *ASSESSMENT: Swelling may be odontogenic and should be examined further in dental clinic.    *PLAN: Transport patient to dental clinic tomorrow, 12/30/19, at 1:00pm for evaluation

## 2019-12-29 NOTE — PROGRESS NOTE ADULT - ATTENDING COMMENTS
Patient seen and examined independently. Agree with resident note/ history / physical exam and plan of care with following exceptions/additions/updates. Case discussed with house-staff, nursing and patient/pt decision maker.   pt has no symptoms, comfortable    Strep anginosus bacteremia Not a  contaminant  Rocephine 2 g q24,   fu repeat bc  fu echo
Pt was seen and examined at bedside independently, pt is comfortable, denies any specific complaints. He was consulted by ID and cardiology, scheduled for FIORELLA on Monday, needs dental consult, treated for bacteriemia.   I agree with medical resident's findings, assessment and plan above.   For now will c/w current medical management and DVT prophylaxis, keep NPO after midnight for FIORELLA on Monday and consult dental.     #Progress Note Handoff  Pending (specify):  f/y repeat blood Cx, NPO after midnight for FIORELLA in AM, Dental consult, c/w IV ABx   Family discussion: n/a, I spoke with pt, he agreed with a plan of care   Disposition: Home__x_/SNF___/Other________/Unknown at this time________

## 2019-12-30 LAB
ALBUMIN SERPL ELPH-MCNC: 3.7 G/DL — SIGNIFICANT CHANGE UP (ref 3.5–5.2)
ALBUMIN SERPL ELPH-MCNC: 3.7 G/DL — SIGNIFICANT CHANGE UP (ref 3.5–5.2)
ALBUMIN SERPL ELPH-MCNC: 4 G/DL — SIGNIFICANT CHANGE UP (ref 3.5–5.2)
ALP SERPL-CCNC: 66 U/L — SIGNIFICANT CHANGE UP (ref 30–115)
ALP SERPL-CCNC: 71 U/L — SIGNIFICANT CHANGE UP (ref 30–115)
ALP SERPL-CCNC: 77 U/L — SIGNIFICANT CHANGE UP (ref 30–115)
ALT FLD-CCNC: 23 U/L — SIGNIFICANT CHANGE UP (ref 0–41)
ALT FLD-CCNC: 24 U/L — SIGNIFICANT CHANGE UP (ref 0–41)
ALT FLD-CCNC: 25 U/L — SIGNIFICANT CHANGE UP (ref 0–41)
ANION GAP SERPL CALC-SCNC: 12 MMOL/L — SIGNIFICANT CHANGE UP (ref 7–14)
ANION GAP SERPL CALC-SCNC: 16 MMOL/L — HIGH (ref 7–14)
ANION GAP SERPL CALC-SCNC: 16 MMOL/L — HIGH (ref 7–14)
AST SERPL-CCNC: 22 U/L — SIGNIFICANT CHANGE UP (ref 0–41)
AST SERPL-CCNC: 22 U/L — SIGNIFICANT CHANGE UP (ref 0–41)
AST SERPL-CCNC: 24 U/L — SIGNIFICANT CHANGE UP (ref 0–41)
BILIRUB SERPL-MCNC: 0.3 MG/DL — SIGNIFICANT CHANGE UP (ref 0.2–1.2)
BILIRUB SERPL-MCNC: <0.2 MG/DL — SIGNIFICANT CHANGE UP (ref 0.2–1.2)
BILIRUB SERPL-MCNC: <0.2 MG/DL — SIGNIFICANT CHANGE UP (ref 0.2–1.2)
BUN SERPL-MCNC: 35 MG/DL — HIGH (ref 10–20)
BUN SERPL-MCNC: 39 MG/DL — HIGH (ref 10–20)
BUN SERPL-MCNC: 40 MG/DL — HIGH (ref 10–20)
CALCIUM SERPL-MCNC: 8.7 MG/DL — SIGNIFICANT CHANGE UP (ref 8.5–10.1)
CALCIUM SERPL-MCNC: 8.8 MG/DL — SIGNIFICANT CHANGE UP (ref 8.5–10.1)
CALCIUM SERPL-MCNC: 9.1 MG/DL — SIGNIFICANT CHANGE UP (ref 8.5–10.1)
CHLORIDE SERPL-SCNC: 101 MMOL/L — SIGNIFICANT CHANGE UP (ref 98–110)
CHLORIDE SERPL-SCNC: 104 MMOL/L — SIGNIFICANT CHANGE UP (ref 98–110)
CHLORIDE SERPL-SCNC: 104 MMOL/L — SIGNIFICANT CHANGE UP (ref 98–110)
CO2 SERPL-SCNC: 23 MMOL/L — SIGNIFICANT CHANGE UP (ref 17–32)
CREAT SERPL-MCNC: 1.7 MG/DL — HIGH (ref 0.7–1.5)
CREAT SERPL-MCNC: 1.8 MG/DL — HIGH (ref 0.7–1.5)
CREAT SERPL-MCNC: 1.8 MG/DL — HIGH (ref 0.7–1.5)
ESTIMATED AVERAGE GLUCOSE: 180 MG/DL — HIGH (ref 68–114)
GLUCOSE BLDC GLUCOMTR-MCNC: 130 MG/DL — HIGH (ref 70–99)
GLUCOSE BLDC GLUCOMTR-MCNC: 148 MG/DL — HIGH (ref 70–99)
GLUCOSE BLDC GLUCOMTR-MCNC: 258 MG/DL — HIGH (ref 70–99)
GLUCOSE BLDC GLUCOMTR-MCNC: 77 MG/DL — SIGNIFICANT CHANGE UP (ref 70–99)
GLUCOSE SERPL-MCNC: 227 MG/DL — HIGH (ref 70–99)
GLUCOSE SERPL-MCNC: 306 MG/DL — HIGH (ref 70–99)
GLUCOSE SERPL-MCNC: 67 MG/DL — LOW (ref 70–99)
HBA1C BLD-MCNC: 7.9 % — HIGH (ref 4–5.6)
HCT VFR BLD CALC: 29.3 % — LOW (ref 42–52)
HGB BLD-MCNC: 9.3 G/DL — LOW (ref 14–18)
MAGNESIUM SERPL-MCNC: 2 MG/DL — SIGNIFICANT CHANGE UP (ref 1.8–2.4)
MCHC RBC-ENTMCNC: 28.7 PG — SIGNIFICANT CHANGE UP (ref 27–31)
MCHC RBC-ENTMCNC: 31.7 G/DL — LOW (ref 32–37)
MCV RBC AUTO: 90.4 FL — SIGNIFICANT CHANGE UP (ref 80–94)
NRBC # BLD: 0 /100 WBCS — SIGNIFICANT CHANGE UP (ref 0–0)
PLATELET # BLD AUTO: 220 K/UL — SIGNIFICANT CHANGE UP (ref 130–400)
POTASSIUM SERPL-MCNC: 4.8 MMOL/L — SIGNIFICANT CHANGE UP (ref 3.5–5)
POTASSIUM SERPL-MCNC: 5 MMOL/L — SIGNIFICANT CHANGE UP (ref 3.5–5)
POTASSIUM SERPL-MCNC: 5.3 MMOL/L — HIGH (ref 3.5–5)
POTASSIUM SERPL-SCNC: 4.8 MMOL/L — SIGNIFICANT CHANGE UP (ref 3.5–5)
POTASSIUM SERPL-SCNC: 5 MMOL/L — SIGNIFICANT CHANGE UP (ref 3.5–5)
POTASSIUM SERPL-SCNC: 5.3 MMOL/L — HIGH (ref 3.5–5)
PROT SERPL-MCNC: 6.3 G/DL — SIGNIFICANT CHANGE UP (ref 6–8)
PROT SERPL-MCNC: 6.3 G/DL — SIGNIFICANT CHANGE UP (ref 6–8)
PROT SERPL-MCNC: 6.5 G/DL — SIGNIFICANT CHANGE UP (ref 6–8)
RBC # BLD: 3.24 M/UL — LOW (ref 4.7–6.1)
RBC # FLD: 11.7 % — SIGNIFICANT CHANGE UP (ref 11.5–14.5)
SODIUM SERPL-SCNC: 139 MMOL/L — SIGNIFICANT CHANGE UP (ref 135–146)
SODIUM SERPL-SCNC: 140 MMOL/L — SIGNIFICANT CHANGE UP (ref 135–146)
SODIUM SERPL-SCNC: 143 MMOL/L — SIGNIFICANT CHANGE UP (ref 135–146)
WBC # BLD: 7.31 K/UL — SIGNIFICANT CHANGE UP (ref 4.8–10.8)
WBC # FLD AUTO: 7.31 K/UL — SIGNIFICANT CHANGE UP (ref 4.8–10.8)

## 2019-12-30 PROCEDURE — 99233 SBSQ HOSP IP/OBS HIGH 50: CPT

## 2019-12-30 PROCEDURE — 93320 DOPPLER ECHO COMPLETE: CPT | Mod: 26

## 2019-12-30 PROCEDURE — 93325 DOPPLER ECHO COLOR FLOW MAPG: CPT | Mod: 26

## 2019-12-30 PROCEDURE — 93312 ECHO TRANSESOPHAGEAL: CPT | Mod: 26,59

## 2019-12-30 RX ORDER — INSULIN LISPRO 100/ML
2 VIAL (ML) SUBCUTANEOUS ONCE
Refills: 0 | Status: COMPLETED | OUTPATIENT
Start: 2019-12-30 | End: 2019-12-30

## 2019-12-30 RX ADMIN — Medication 5 UNIT(S): at 16:55

## 2019-12-30 RX ADMIN — Medication 2 UNIT(S): at 22:40

## 2019-12-30 RX ADMIN — SIMVASTATIN 40 MILLIGRAM(S): 20 TABLET, FILM COATED ORAL at 22:04

## 2019-12-30 RX ADMIN — INSULIN GLARGINE 28 UNIT(S): 100 INJECTION, SOLUTION SUBCUTANEOUS at 22:05

## 2019-12-30 RX ADMIN — Medication 100 MILLIGRAM(S): at 05:37

## 2019-12-30 RX ADMIN — CEFTRIAXONE 100 MILLIGRAM(S): 500 INJECTION, POWDER, FOR SOLUTION INTRAMUSCULAR; INTRAVENOUS at 22:04

## 2019-12-30 RX ADMIN — LISINOPRIL 20 MILLIGRAM(S): 2.5 TABLET ORAL at 05:37

## 2019-12-30 RX ADMIN — Medication 81 MILLIGRAM(S): at 11:11

## 2019-12-30 RX ADMIN — Medication 5 UNIT(S): at 11:37

## 2019-12-30 NOTE — PROCEDURE NOTE - NSPROCDETAILS_GEN_ALL_CORE
sterile dressing applied/ultrasound guidance/location identified, draped/prepped, sterile technique used/ultrasound assessment/sterile technique, catheter placed

## 2019-12-30 NOTE — PROGRESS NOTE ADULT - SUBJECTIVE AND OBJECTIVE BOX
Hospital Day:  3d    Subjective: Patient is a 69y old  Male who presents with a chief complaint of Positive blood cx (27 Dec 2019 20:35)    Pt seen and evaluated at bedside. no over the night acute events reported.   No new complaints.   Denies HA, Fever, chills, cp, sob, abd pain, nausea, vomiting, diarrhea, constipation, dysuria.     Past Medical Hx:   Hyperlipidemia  HTN (hypertension)  Type 2 diabetes mellitus  CAD (coronary artery disease)    Past Sx:  History of appendectomy  S/P CABG x 3    Allergies:  No Known Allergies    Current Meds:   Standng Meds:  aspirin enteric coated 81 milliGRAM(s) Oral daily  cefTRIAXone   IVPB 2000 milliGRAM(s) IV Intermittent every 24 hours  chlorhexidine 4% Liquid 1 Application(s) Topical <User Schedule>  dextrose 5%. 1000 milliLiter(s) (50 mL/Hr) IV Continuous <Continuous>  dextrose 50% Injectable 12.5 Gram(s) IV Push once  dextrose 50% Injectable 25 Gram(s) IV Push once  dextrose 50% Injectable 25 Gram(s) IV Push once  heparin  Injectable 5000 Unit(s) SubCutaneous every 12 hours  influenza   Vaccine 0.5 milliLiter(s) IntraMuscular once  insulin glargine Injectable (LANTUS) 28 Unit(s) SubCutaneous at bedtime  insulin lispro (HumaLOG) corrective regimen sliding scale   SubCutaneous three times a day before meals  insulin lispro Injectable (HumaLOG) 5 Unit(s) SubCutaneous three times a day before meals  lisinopril 20 milliGRAM(s) Oral daily  metoprolol succinate  milliGRAM(s) Oral daily  simvastatin 40 milliGRAM(s) Oral at bedtime    PRN Meds:  dextrose 40% Gel 15 Gram(s) Oral once PRN Blood Glucose LESS THAN 70 milliGRAM(s)/deciliter  glucagon  Injectable 1 milliGRAM(s) IntraMuscular once PRN Glucose LESS THAN 70 milligrams/deciliter    HOME MEDICATIONS:  Aspir 81 oral delayed release tablet: 1 tab(s) orally once a day  lisinopril 20 mg oral tablet: 1 tab(s) orally once a day  metoprolol succinate 100 mg oral tablet, extended release: 1 tab(s) orally once a day  NovoLOG FlexPen 100 units/mL injectable solution: 5 unit(s) injectable 2 times a day (with meals), As Needed  simvastatin 40 mg oral tablet: 1 tab(s) orally once a day (at bedtime)  Tresiba 100 units/mL subcutaneous solution: 28 unit(s) subcutaneous once a day (at bedtime)      Vital Signs:   T(F): 97.5 (12-30-19 @ 05:42), Max: 98.6 (12-29-19 @ 21:00)  HR: 58 (12-30-19 @ 05:42) (58 - 65)  BP: 145/69 (12-30-19 @ 05:42) (145/69 - 172/77)  RR: 18 (12-30-19 @ 05:42) (18 - 18)  SpO2: --        Physical Exam:   GENERAL: NAD, resting in bed  HEENT: NCAT, 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   HEART: Regular rate and rhythm; s1 s2 appreciated, No murmurs, rubs, or gallops  ABDOMEN: Soft, Nontender, Nondistended; Bowel sounds present  EXTREMITIES: No LE edema b/l  NERVOUS SYSTEM:  Alert & Oriented X3    Labs:                         9.1    7.25  )-----------( 208      ( 29 Dec 2019 23:17 )             29.2     Neutophil% 61.7, Lymphocyte% 21.0, Monocyte% 11.9, Bands% 0.4 12-29-19 @ 23:17    30 Dec 2019 01:36    139    |  104    |  39     ----------------------------<  227    4.8     |  23     |  1.7      Ca    8.7        30 Dec 2019 01:36  Mg     2.0       29 Dec 2019 23:17    TPro  6.3    /  Alb  3.7    /  TBili  <0.2   /  DBili  x      /  AST  22     /  ALT  24     /  AlkPhos  71     30 Dec 2019 01:36       pTT    28.2             ----< 1.07 INR  (12-29-19 @ 23:17)    12.30        PT    Hemoglobin A1C, Whole Blood: 8.0 % (12-25-19 @ 05:55)    Iron 74, TIBC 222, %Sat 33 Ferritin 309 12-28-19 @ 07:26    Culture - Blood (collected 12-28-19 @ 07:26)  Source: .Blood None  Preliminary Report (12-29-19 @ 20:01):    No growth to date.    Culture - Blood (collected 12-27-19 @ 15:13)  Source: .Blood Blood-Peripheral  Preliminary Report (12-29-19 @ 01:01):    No growth to date.    Culture - Blood (collected 12-27-19 @ 15:13)  Source: .Blood Blood-Peripheral  Preliminary Report (12-29-19 @ 01:01):    No growth to date.    Culture - Blood (collected 12-24-19 @ 23:04)  Source: .Blood Blood-Venous  Gram Stain (12-26-19 @ 02:53):    Growth in anaerobic bottle: Gram Positive Cocci in Pairs and Chains  Final Report (12-26-19 @ 23:59):    Growth in anaerobic bottle: Streptococcus anginosus "Susceptibilities not    performed"    "Due to technical problems, Proteus sp. will Not be reported as part of    the BCID panel until further notice"    ***Blood Panel PCR results on this specimen are available    approximately 3 hours after the Gram stain result.***    Gram stain, PCR, and/or culture results may not always    correspond due to difference in methodologies.    ************************************************************    This PCR assay was performed using [employer].    The following targets are tested for: Enterococcus,    vancomycin resistant enterococci, Listeria monocytogenes,    coagulase negative staphylococci, S. aureus,    methicillin resistant S. aureus, Streptococcus agalactiae    (Group B), S. pneumoniae, S. pyogenes (Group A),    Acinetobacter baumannii, Enterobacter cloacae, E. coli,    Klebsiella oxytoca, K. pneumoniae, Proteus sp.,    Serratia marcescens, Haemophilus influenzae,    Neisseria meningitidis, Pseudomonas aeruginosa, Candida    albicans, C. glabrata, C krusei, C parapsilosis,    C. tropicalis and the KPC resistance gene.  Organism: Blood Culture PCR (12-26-19 @ 23:59)  Organism: Blood Culture PCR (12-26-19 @ 23:59)      -  Streptococcus sp. (Not Grp A, B or S pneumoniae): Detec      Method Type: PCR    Culture - Blood (collected 12-24-19 @ 23:04)  Source: .Blood Blood-Venous  Gram Stain (12-26-19 @ 09:51):    Growth in anaerobic bottle: Gram Positive Cocci in Pairs and Chains  Final Report (12-28-19 @ 16:31):    Growth in anaerobic bottle: Streptococcus anginosus  Organism: Streptococcus anginosus  Streptococcus anginosus (12-28-19 @ 16:31)  Organism: Streptococcus anginosus (12-28-19 @ 16:31)      -  Clindamycin: S      -  Erythromycin: S      -  Levofloxacin: S      -  Vancomycin: S      Method Type: KB  Organism: Streptococcus anginosus (12-28-19 @ 16:31)      -  Ceftriaxone: S 0.5      -  Penicillin: S 0.125      Method Type: ETEST    Radiology:     < from: CT Abdomen and Pelvis No Cont (12.25.19 @ 01:48) >  IMPRESSION:     Mesenteric stranding surrounding the proximal duodenum with circumferential thickening of the duodenal wall may represent a component of enteritis.    Additional attending comments: No definitive CT evidence for acute intra-abdominal or pelvic pathology    < end of copied text >    Assessment and Plan:   69 year old male with PMH of HTN, DM II, DLD, CAD s/p CABG (2001) recently presented to ED for viral gastroenteritis, at that visit (12/25/19) blood cultures were drawn positive for -  Streptococcus sp. (Not Grp A, B or S pneumoniae): Detec (12.24.19 @ 23:04), patient was called back  for further work up.     # Positive blood cultures with Strep Anginus and possible Aortic valve vegetation  -per ID never a contaminant  -per ID started c/w Rocephin  -2D Echo- possible aortic valve vegetation  - repeat blood cx 12/27 and 12/28 negative  -cardiology following the case would like to rule out vegetation with FIORELLA today   - dental clinic appt tentatively today at 1 pm    # CKD likely secondary to DM  -Cr 1.6 (unknown baseline), stable and downtrending  -Monitor BMP    # DM II  -Continue with Insulin regimen.   -Monitor Fingerstick glucose  - requires titration up    # HTN   - Continue Toprol and Lisinopril    # DLD   - Continue Simvastatin    # CAD s/p CABG   - Continue ASA    Activity: IAT  Diet: DASH/TLC  GI/DVT: no protonix, on hep  Dispo: FIORELLA and dental clinic appt  Code: full Hospital Day:  3d    Subjective: Patient is a 69y old  Male who presents with a chief complaint of Positive blood cx (27 Dec 2019 20:35)    Pt seen and evaluated at bedside. no over the night acute events reported.   No new complaints.   Denies HA, Fever, chills, cp, sob, abd pain, nausea, vomiting, diarrhea, constipation, dysuria.     Past Medical Hx:   Hyperlipidemia  HTN (hypertension)  Type 2 diabetes mellitus  CAD (coronary artery disease)    Past Sx:  History of appendectomy  S/P CABG x 3    Allergies:  No Known Allergies    Current Meds:   Standng Meds:  aspirin enteric coated 81 milliGRAM(s) Oral daily  cefTRIAXone   IVPB 2000 milliGRAM(s) IV Intermittent every 24 hours  chlorhexidine 4% Liquid 1 Application(s) Topical <User Schedule>  dextrose 5%. 1000 milliLiter(s) (50 mL/Hr) IV Continuous <Continuous>  dextrose 50% Injectable 12.5 Gram(s) IV Push once  dextrose 50% Injectable 25 Gram(s) IV Push once  dextrose 50% Injectable 25 Gram(s) IV Push once  heparin  Injectable 5000 Unit(s) SubCutaneous every 12 hours  influenza   Vaccine 0.5 milliLiter(s) IntraMuscular once  insulin glargine Injectable (LANTUS) 28 Unit(s) SubCutaneous at bedtime  insulin lispro (HumaLOG) corrective regimen sliding scale   SubCutaneous three times a day before meals  insulin lispro Injectable (HumaLOG) 5 Unit(s) SubCutaneous three times a day before meals  lisinopril 20 milliGRAM(s) Oral daily  metoprolol succinate  milliGRAM(s) Oral daily  simvastatin 40 milliGRAM(s) Oral at bedtime    PRN Meds:  dextrose 40% Gel 15 Gram(s) Oral once PRN Blood Glucose LESS THAN 70 milliGRAM(s)/deciliter  glucagon  Injectable 1 milliGRAM(s) IntraMuscular once PRN Glucose LESS THAN 70 milligrams/deciliter    HOME MEDICATIONS:  Aspir 81 oral delayed release tablet: 1 tab(s) orally once a day  lisinopril 20 mg oral tablet: 1 tab(s) orally once a day  metoprolol succinate 100 mg oral tablet, extended release: 1 tab(s) orally once a day  NovoLOG FlexPen 100 units/mL injectable solution: 5 unit(s) injectable 2 times a day (with meals), As Needed  simvastatin 40 mg oral tablet: 1 tab(s) orally once a day (at bedtime)  Tresiba 100 units/mL subcutaneous solution: 28 unit(s) subcutaneous once a day (at bedtime)      Vital Signs:   T(F): 97.5 (12-30-19 @ 05:42), Max: 98.6 (12-29-19 @ 21:00)  HR: 58 (12-30-19 @ 05:42) (58 - 65)  BP: 145/69 (12-30-19 @ 05:42) (145/69 - 172/77)  RR: 18 (12-30-19 @ 05:42) (18 - 18)  SpO2: --        Physical Exam:   GENERAL: NAD, resting in bed  HEENT: NCAT, 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   HEART: Regular rate and rhythm; s1 s2 appreciated, No murmurs, rubs, or gallops  ABDOMEN: Soft, Nontender, Nondistended; Bowel sounds present  EXTREMITIES: No LE edema b/l  NERVOUS SYSTEM:  Alert & Oriented X3    Labs:                         9.1    7.25  )-----------( 208      ( 29 Dec 2019 23:17 )             29.2     Neutophil% 61.7, Lymphocyte% 21.0, Monocyte% 11.9, Bands% 0.4 12-29-19 @ 23:17    30 Dec 2019 01:36    139    |  104    |  39     ----------------------------<  227    4.8     |  23     |  1.7      Ca    8.7        30 Dec 2019 01:36  Mg     2.0       29 Dec 2019 23:17    TPro  6.3    /  Alb  3.7    /  TBili  <0.2   /  DBili  x      /  AST  22     /  ALT  24     /  AlkPhos  71     30 Dec 2019 01:36       pTT    28.2             ----< 1.07 INR  (12-29-19 @ 23:17)    12.30        PT    Hemoglobin A1C, Whole Blood: 8.0 % (12-25-19 @ 05:55)    Iron 74, TIBC 222, %Sat 33 Ferritin 309 12-28-19 @ 07:26    Culture - Blood (collected 12-28-19 @ 07:26)  Source: .Blood None  Preliminary Report (12-29-19 @ 20:01):    No growth to date.    Culture - Blood (collected 12-27-19 @ 15:13)  Source: .Blood Blood-Peripheral  Preliminary Report (12-29-19 @ 01:01):    No growth to date.    Culture - Blood (collected 12-27-19 @ 15:13)  Source: .Blood Blood-Peripheral  Preliminary Report (12-29-19 @ 01:01):    No growth to date.    Culture - Blood (collected 12-24-19 @ 23:04)  Source: .Blood Blood-Venous  Gram Stain (12-26-19 @ 02:53):    Growth in anaerobic bottle: Gram Positive Cocci in Pairs and Chains  Final Report (12-26-19 @ 23:59):    Growth in anaerobic bottle: Streptococcus anginosus "Susceptibilities not    performed"    "Due to technical problems, Proteus sp. will Not be reported as part of    the BCID panel until further notice"    ***Blood Panel PCR results on this specimen are available    approximately 3 hours after the Gram stain result.***    Gram stain, PCR, and/or culture results may not always    correspond due to difference in methodologies.    ************************************************************    This PCR assay was performed using [employer].    The following targets are tested for: Enterococcus,    vancomycin resistant enterococci, Listeria monocytogenes,    coagulase negative staphylococci, S. aureus,    methicillin resistant S. aureus, Streptococcus agalactiae    (Group B), S. pneumoniae, S. pyogenes (Group A),    Acinetobacter baumannii, Enterobacter cloacae, E. coli,    Klebsiella oxytoca, K. pneumoniae, Proteus sp.,    Serratia marcescens, Haemophilus influenzae,    Neisseria meningitidis, Pseudomonas aeruginosa, Candida    albicans, C. glabrata, C krusei, C parapsilosis,    C. tropicalis and the KPC resistance gene.  Organism: Blood Culture PCR (12-26-19 @ 23:59)  Organism: Blood Culture PCR (12-26-19 @ 23:59)      -  Streptococcus sp. (Not Grp A, B or S pneumoniae): Detec      Method Type: PCR    Culture - Blood (collected 12-24-19 @ 23:04)  Source: .Blood Blood-Venous  Gram Stain (12-26-19 @ 09:51):    Growth in anaerobic bottle: Gram Positive Cocci in Pairs and Chains  Final Report (12-28-19 @ 16:31):    Growth in anaerobic bottle: Streptococcus anginosus  Organism: Streptococcus anginosus  Streptococcus anginosus (12-28-19 @ 16:31)  Organism: Streptococcus anginosus (12-28-19 @ 16:31)      -  Clindamycin: S      -  Erythromycin: S      -  Levofloxacin: S      -  Vancomycin: S      Method Type: KB  Organism: Streptococcus anginosus (12-28-19 @ 16:31)      -  Ceftriaxone: S 0.5      -  Penicillin: S 0.125      Method Type: ETEST    Radiology:     < from: CT Abdomen and Pelvis No Cont (12.25.19 @ 01:48) >  IMPRESSION:     Mesenteric stranding surrounding the proximal duodenum with circumferential thickening of the duodenal wall may represent a component of enteritis.    Additional attending comments: No definitive CT evidence for acute intra-abdominal or pelvic pathology    < end of copied text >    Assessment and Plan:   69 year old male with PMH of HTN, DM II, DLD, CAD s/p CABG (2001) recently presented to ED for viral gastroenteritis, at that visit (12/25/19) blood cultures were drawn positive for -  Streptococcus sp. (Not Grp A, B or S pneumoniae): Detec (12.24.19 @ 23:04), patient was called back  for further work up.     # Positive blood cultures with Strep Anginus and possible Aortic valve vegetation  -per ID never a contaminant  -per ID started c/w Rocephin  -2D Echo- possible aortic valve vegetation  - repeat blood cx 12/27 and 12/28 negative  -cardiology following the case would like to rule out vegetation with FIORELLA today  - negative for Endocarditis.   - dental clinic appt tentatively today at 1 pm  - ID: Rocephin 2 gm iv q24h. Duration will be based on above results but will be at least 14 d iv.       # CKD likely secondary to DM  -Cr 1.6 (unknown baseline), stable and downtrending  -Monitor BMP    # DM II  -Continue with Insulin regimen.   -Monitor Fingerstick glucose  - requires titration up    # HTN   - Continue Toprol and Lisinopril    # DLD   - Continue Simvastatin    # CAD s/p CABG   - Continue ASA    Activity: IAT  Diet: DASH/TLC  GI/DVT: no protonix, on hep  Dispo: FIORELLA and dental clinic appt  Code: full

## 2019-12-30 NOTE — PROGRESS NOTE ADULT - ASSESSMENT
69 year old male with PMH of HTN, DM II, DLD, CAD s/p CABG (2001) recently presented to ED for viral gastroenteritis, at that visit (12/25/19) blood cultures were drawn positive for -  Streptococcus sp. (Not Grp A, B or S pneumoniae): Detec (12.24.19 @ 23:04), patient was called back  for further work up.     # Positive blood cultures with Strep Anginus and possible Aortic valve vegetation  - currently on  Rocephin  -2D Echo- possible aortic valve vegetation  - repeat blood cx 12/27 and 12/28 negative  - s/p  FIORELLA today  - negative for Endocarditis.   - dental clinic appt tentatively today at 1 pm  - currently on  Rocephin 2 gm iv q24h.    s/p Midline today.    will f/u with ID for final duration of iv abx regimen.        # CKD likely secondary to DM  -Cr 1.6 (unknown baseline), stable and downtrending  -Monitor BMP    # DM II  -Continue with Insulin regimen.   -Monitor Fingerstick glucose    # HTN   - Continue Toprol and Lisinopril    # DLD   - Continue Simvastatin    # CAD s/p CABG   - Continue ASA    Activity: IAT  Diet: DASH/TLC  GI/DVT: no protonix, on hep  Dispo: FIORELLA and dental clinic appt  Code: full    #Progress Note Handoff  Pending (specify):  dental eval// _  Family discussion:  Disposition: Home_

## 2019-12-30 NOTE — PROGRESS NOTE ADULT - ASSESSMENT
· Assessment		   69y old  Male who presents with a chief complaint of chills. Found  to have positive blood cultures, called back for re-admission.     IMPRESSION:  Strep anginosus bacteremia secondary to possible AV endocarditis/ dental origin  FIORELLA Mild AI ans MR no clear evidence of endocarditis   Dental origin ( which is more probable as the pt was symptomatic 1 day prior to the onset of acute chills with dental pain )  ECHO with possible AV vegetation  No sepsis  BCx 12/24 S anginosus  BCx 12/27 NG    RECOMMENDATIONS:  FIORELLA 12/30  Dental evaluation  Rocephin 2 gm iv q24h x 14 days from cleared BCX as FIORELLA no endocarditis  midline

## 2019-12-30 NOTE — PROGRESS NOTE ADULT - SUBJECTIVE AND OBJECTIVE BOX
MILLIE RAMIN  69y  Male      Patient is a 69y old  Male who presents with a chief complaint of Bacteremia (30 Dec 2019 07:40)      INTERVAL HPI/OVERNIGHT EVENTS:      ******************************* REVIEW OF SYSTEMS:**********************************************    All other review of systems negative    *********************** VITALS ******************************************    T(F): 96.9 (12-30-19 @ 14:09)  HR: 55 (12-30-19 @ 14:09) (55 - 65)  BP: 173/71 (12-30-19 @ 14:09) (145/69 - 173/71)  RR: 16 (12-30-19 @ 14:09) (16 - 18)  SpO2: --    12-30-19 @ 07:01  -  12-30-19 @ 16:31  --------------------------------------------------------  IN: 240 mL / OUT: 0 mL / NET: 240 mL            12-30-19 @ 07:01  -  12-30-19 @ 16:31  --------------------------------------------------------  IN: 240 mL / OUT: 0 mL / NET: 240 mL        ******************************** PHYSICAL EXAM:**************************************************  GENERAL: NAD    PSYCH: no agitation, baseline mentation  HEENT:     NERVOUS SYSTEM:  Alert & Oriented X3, MS  5/5 B/L  UE and LE ; Sensory intact    PULMONARY: CATIE, CTA    CARDIOVASCULAR: S1S2 RRR    GI: Soft, NT, ND; BS present.    EXTREMITIES:  2+ Peripheral Pulses, No clubbing, cyanosis, or edema    LYMPH: No lymphadenopathy noted    SKIN: No rashes or lesions    ******************************************************************************************      **************************** LABS *******************************************************                          9.3    7.31  )-----------( 220      ( 30 Dec 2019 06:54 )             29.3     12-30    143  |  104  |  35<H>  ----------------------------<  67<L>  5.0   |  23  |  1.8<H>    Ca    8.8      30 Dec 2019 06:54  Mg     2.0     12-29    TPro  6.3  /  Alb  3.7  /  TBili  0.3  /  DBili  x   /  AST  22  /  ALT  23  /  AlkPhos  66  12-30        PT/INR - ( 29 Dec 2019 23:17 )   PT: 12.30 sec;   INR: 1.07 ratio         PTT - ( 29 Dec 2019 23:17 )  PTT:28.2 sec  Lactate Trend        CAPILLARY BLOOD GLUCOSE      POCT Blood Glucose.: 130 mg/dL (30 Dec 2019 11:29)          **************************Active Medications *******************************************  No Known Allergies      aspirin enteric coated 81 milliGRAM(s) Oral daily  cefTRIAXone   IVPB 2000 milliGRAM(s) IV Intermittent every 24 hours  chlorhexidine 4% Liquid 1 Application(s) Topical <User Schedule>  dextrose 40% Gel 15 Gram(s) Oral once PRN  dextrose 5%. 1000 milliLiter(s) IV Continuous <Continuous>  dextrose 50% Injectable 12.5 Gram(s) IV Push once  dextrose 50% Injectable 25 Gram(s) IV Push once  dextrose 50% Injectable 25 Gram(s) IV Push once  glucagon  Injectable 1 milliGRAM(s) IntraMuscular once PRN  heparin  Injectable 5000 Unit(s) SubCutaneous every 12 hours  influenza   Vaccine 0.5 milliLiter(s) IntraMuscular once  insulin glargine Injectable (LANTUS) 28 Unit(s) SubCutaneous at bedtime  insulin lispro (HumaLOG) corrective regimen sliding scale   SubCutaneous three times a day before meals  insulin lispro Injectable (HumaLOG) 5 Unit(s) SubCutaneous three times a day before meals  lisinopril 20 milliGRAM(s) Oral daily  metoprolol succinate  milliGRAM(s) Oral daily  simvastatin 40 milliGRAM(s) Oral at bedtime      ***************************************************  RADIOLOGY & ADDITIONAL TESTS:    Imaging Personally Reviewed:  [ ] YES  [ ] NO    HEALTH ISSUES - PROBLEM Dx:

## 2019-12-30 NOTE — PROGRESS NOTE ADULT - SUBJECTIVE AND OBJECTIVE BOX
SUBJ:    no cp or sob       MEDICATIONS  (STANDING):  aspirin enteric coated 81 milliGRAM(s) Oral daily  cefTRIAXone   IVPB 2000 milliGRAM(s) IV Intermittent every 24 hours  chlorhexidine 4% Liquid 1 Application(s) Topical <User Schedule>  dextrose 5%. 1000 milliLiter(s) (50 mL/Hr) IV Continuous <Continuous>  dextrose 50% Injectable 12.5 Gram(s) IV Push once  dextrose 50% Injectable 25 Gram(s) IV Push once  dextrose 50% Injectable 25 Gram(s) IV Push once  heparin  Injectable 5000 Unit(s) SubCutaneous every 12 hours  influenza   Vaccine 0.5 milliLiter(s) IntraMuscular once  insulin glargine Injectable (LANTUS) 28 Unit(s) SubCutaneous at bedtime  insulin lispro (HumaLOG) corrective regimen sliding scale   SubCutaneous three times a day before meals  insulin lispro Injectable (HumaLOG) 5 Unit(s) SubCutaneous three times a day before meals  lisinopril 20 milliGRAM(s) Oral daily  metoprolol succinate  milliGRAM(s) Oral daily  simvastatin 40 milliGRAM(s) Oral at bedtime    MEDICATIONS  (PRN):  dextrose 40% Gel 15 Gram(s) Oral once PRN Blood Glucose LESS THAN 70 milliGRAM(s)/deciliter  glucagon  Injectable 1 milliGRAM(s) IntraMuscular once PRN Glucose LESS THAN 70 milligrams/deciliter            Vital Signs Last 24 Hrs  T(C): 36.4 (30 Dec 2019 07:52), Max: 37 (29 Dec 2019 21:00)  T(F): 97.5 (30 Dec 2019 05:42), Max: 98.6 (29 Dec 2019 21:00)  HR: 58 (30 Dec 2019 07:52) (58 - 65)  BP: 145/69 (30 Dec 2019 07:52) (145/69 - 172/77)  BP(mean): --  RR: 18 (30 Dec 2019 07:52) (18 - 18)  SpO2: --          PHYSICAL EXAM:  · CONSTITUTIONAL:	Well-developed, well nourished    BMI-  ·RESPIRATORY:   airway patent; breath sounds equal; good air movement; respirations non-labored; clear to auscultation bilaterally; no chest wall tenderness; no intercostal retractions; no rales,rhonchi or wheeze  · CARDIOVASCULAR	regular rate and rhythm  no rub  no murmur  normal PMI  · EXTREMITIES: No cyanosis, clubbing or edema    	  TELEMETRY:    ECG:    TTE:    LABS:                        9.1    7.25  )-----------( 208      ( 29 Dec 2019 23:17 )             29.2     12-30    139  |  104  |  39<H>  ----------------------------<  227<H>  4.8   |  23  |  1.7<H>    Ca    8.7      30 Dec 2019 01:36  Mg     2.0     12-29    TPro  6.3  /  Alb  3.7  /  TBili  <0.2  /  DBili  x   /  AST  22  /  ALT  24  /  AlkPhos  71  12-30        PT/INR - ( 29 Dec 2019 23:17 )   PT: 12.30 sec;   INR: 1.07 ratio         PTT - ( 29 Dec 2019 23:17 )  PTT:28.2 sec    I&O's Summary    BNP  RADIOLOGY & ADDITIONAL STUDIES:    IMPRESSION AND PLAN:  + Culture  FIORELLA today risk benefits explained  consent obtained

## 2019-12-30 NOTE — PROGRESS NOTE ADULT - SUBJECTIVE AND OBJECTIVE BOX
I have personally seen and examined the patient.  I agree with the history and physical which I have reviewed and noted any changes below.  12-30-19 @ 08:36    PRE-OP DIAGNOSIS:  endocarditis  PROCEDURE:FIORELLA    Physician:  MD  Assistant:  MD    ANESTHESIA TYPE:  [  ]General Anesthesia  [ X] Sedation  [ X] Local/Regional    ESTIMATED BLOOD LOSS:       mL    CONDITION  [  ] Critical  [  ] Serious  [  ]Fair  [ X]Good    Specimens removed: none    IMplants: none    Complications: none      FINDINGS    Preliminary Findings:  RAPHAEL: Left atrial appendage was clear of clot and smoke.  LV: LVEF was estimated at 55-65%  MV:1+ MR, No evidence for MS.   AV: 1+ AI, no evidence for AS.   TV: 1+ TR.   IAS: no PFO. NO R-> L shunt.   There was mild, non-mobile atheroma seen in the thoracic aorta.     Patient succesfully converted to sinus rhythm with synchronized  ___ J of direct current cardioversion.    Final report to follow.      POST-OP DIAGNOSIS  Mild AI ans MR no clear evidence of endocarditis         PLAN OF CARE  [ ] D/C Home today  [ ]  D/C in AM  x] Return to In-patient bed

## 2019-12-30 NOTE — CONSULT NOTE ADULT - SUBJECTIVE AND OBJECTIVE BOX
Patient is a 69y old  Male who presents with a chief complaint of swelling/slight pain upon palpation on lower right.      HPI:  69 year old male with PMH of HTN, DM II, DLD, CAD s/p CABG (2001) presented to the ED because he was called for IV antibiotics as blood cultures came back positive. As per the patient on december 24th night he started complaining of chills, nausea, vomiting and body aches which came out all of a sudden. He was admitted to the hospital, received one time dose of rocephin 1g and later started to improve. He was able to sleep well, retain his food with out throwing up.   He denies any recurrence of symptoms, feels totally fine. He says he never had these kind of symptoms before, did not have any recent procedure, dental evaluation done, but complained of tooth pain which has resolved. He had colonoscopy done roughly 5-6 years ago which showed diverticulosis. (27 Dec 2019 13:55)      PAST MEDICAL & SURGICAL HISTORY:  Hyperlipidemia  HTN (hypertension)  Type 2 diabetes mellitus  CAD (coronary artery disease)  History of appendectomy  S/P CABG x 3: 2001    MEDICATIONS  (STANDING):  aspirin enteric coated 81 milliGRAM(s) Oral daily  cefTRIAXone   IVPB 2000 milliGRAM(s) IV Intermittent every 24 hours  chlorhexidine 4% Liquid 1 Application(s) Topical <User Schedule>  dextrose 5%. 1000 milliLiter(s) (50 mL/Hr) IV Continuous <Continuous>  dextrose 50% Injectable 12.5 Gram(s) IV Push once  dextrose 50% Injectable 25 Gram(s) IV Push once  dextrose 50% Injectable 25 Gram(s) IV Push once  heparin  Injectable 5000 Unit(s) SubCutaneous every 12 hours  influenza   Vaccine 0.5 milliLiter(s) IntraMuscular once  insulin glargine Injectable (LANTUS) 28 Unit(s) SubCutaneous at bedtime  insulin lispro (HumaLOG) corrective regimen sliding scale   SubCutaneous three times a day before meals  insulin lispro Injectable (HumaLOG) 5 Unit(s) SubCutaneous three times a day before meals  lisinopril 20 milliGRAM(s) Oral daily  metoprolol succinate  milliGRAM(s) Oral daily  simvastatin 40 milliGRAM(s) Oral at bedtime    MEDICATIONS  (PRN):  dextrose 40% Gel 15 Gram(s) Oral once PRN Blood Glucose LESS THAN 70 milliGRAM(s)/deciliter  glucagon  Injectable 1 milliGRAM(s) IntraMuscular once PRN Glucose LESS THAN 70 milligrams/deciliter      Allergies  No Known Allergies    *Last Dental Visit: A year ago    Vital Signs Last 24 Hrs  T(C): 36.1 (30 Dec 2019 14:09), Max: 37 (29 Dec 2019 21:00)  T(F): 96.9 (30 Dec 2019 14:09), Max: 98.6 (29 Dec 2019 21:00)  HR: 55 (30 Dec 2019 14:09) (55 - 65)  BP: 173/71 (30 Dec 2019 14:09) (145/69 - 173/71)  BP(mean): --  RR: 16 (30 Dec 2019 14:09) (16 - 18)  SpO2: --    LABS:                        9.3    7.31  )-----------( 220      ( 30 Dec 2019 06:54 )             29.3     12-30    143  |  104  |  35<H>  ----------------------------<  67<L>  5.0   |  23  |  1.8<H>    Ca    8.8      30 Dec 2019 06:54  Mg     2.0     12-29    TPro  6.3  /  Alb  3.7  /  TBili  0.3  /  DBili  x   /  AST  22  /  ALT  23  /  AlkPhos  66  12-30    Platelet Count - Automated: 220 K/uL [130 - 400] (12-30 @ 06:54)  WBC Count: 7.31 K/uL [4.80 - 10.80] (12-30 @ 06:54)  Platelet Count - Automated: 208 K/uL [130 - 400] (12-29 @ 23:17)  WBC Count: 7.25 K/uL [4.80 - 10.80] (12-29 @ 23:17)  INR: 1.07 ratio [0.65 - 1.30] (12-29 @ 23:17)  WBC Count: 7.38 K/uL [4.80 - 10.80] (12-29 @ 07:57)  Platelet Count - Automated: 236 K/uL [130 - 400] (12-29 @ 07:57)  Platelet Count - Automated: 218 K/uL [130 - 400] (12-28 @ 07:26)  WBC Count: 7.36 K/uL [4.80 - 10.80] (12-28 @ 07:26)  Culture Results:   No growth to date. (12-28 @ 07:26)    PT/INR - ( 29 Dec 2019 23:17 )   PT: 12.30 sec;   INR: 1.07 ratio     PTT - ( 29 Dec 2019 23:17 )  PTT:28.2 sec    EOE:  TMJ ( n ) clicks                     ( n ) pops                     ( n ) crepitus             Mandible <<FROM>>             Facial bones and MOM <<grossly intact>>             ( n ) trismus             ( n ) lymphadenopathy             ( n ) swelling             ( n ) asymmetry             ( n ) palpation             ( n ) dyspnea             ( n ) dysphagia             ( n ) loss of consciousness    IOE:             hard/soft palate: No significant findings           tongue/FOM <<No pathology noted>>           labial/buccal mucosa: Firm swelling on buccal mucosa near #26,27, slight tenderness to percussion on #26.           ( y ) percussion           ( y ) palpation           ( y ) swelling            ( n ) abscess           ( n ) sinus tract    *DENTAL RADIOGRAPHS: 3 periapicals taken for #26,27,28,29    *ASSESSMENT: No sign of dental caries and no history of trauma. The swelling is unlikely odontogenic. Firm swelling on buccal near #26,27. Informed patient that he needs to see an oral surgeon for biopsy or possible culture of the swelling if drainage is achieved. Patient understands and will see his oral surgeon for the biopsy.    RECOMMENDATIONS:  1) Dental F/U with outpatient dentist for comprehensive dental care.   2) If any difficulty swallowing/breathing, fever occur, return to ER.     Addy Zuleta DDS

## 2019-12-31 ENCOUNTER — TRANSCRIPTION ENCOUNTER (OUTPATIENT)
Age: 69
End: 2019-12-31

## 2019-12-31 VITALS
DIASTOLIC BLOOD PRESSURE: 63 MMHG | HEART RATE: 59 BPM | SYSTOLIC BLOOD PRESSURE: 135 MMHG | OXYGEN SATURATION: 99 % | RESPIRATION RATE: 19 BRPM | TEMPERATURE: 97 F

## 2019-12-31 DIAGNOSIS — E11.22 TYPE 2 DIABETES MELLITUS WITH DIABETIC CHRONIC KIDNEY DISEASE: ICD-10-CM

## 2019-12-31 DIAGNOSIS — D64.9 ANEMIA, UNSPECIFIED: ICD-10-CM

## 2019-12-31 DIAGNOSIS — E78.5 HYPERLIPIDEMIA, UNSPECIFIED: ICD-10-CM

## 2019-12-31 DIAGNOSIS — R50.9 FEVER, UNSPECIFIED: ICD-10-CM

## 2019-12-31 DIAGNOSIS — I12.9 HYPERTENSIVE CHRONIC KIDNEY DISEASE WITH STAGE 1 THROUGH STAGE 4 CHRONIC KIDNEY DISEASE, OR UNSPECIFIED CHRONIC KIDNEY DISEASE: ICD-10-CM

## 2019-12-31 DIAGNOSIS — Z79.84 LONG TERM (CURRENT) USE OF ORAL HYPOGLYCEMIC DRUGS: ICD-10-CM

## 2019-12-31 DIAGNOSIS — I25.10 ATHEROSCLEROTIC HEART DISEASE OF NATIVE CORONARY ARTERY WITHOUT ANGINA PECTORIS: ICD-10-CM

## 2019-12-31 DIAGNOSIS — Z95.1 PRESENCE OF AORTOCORONARY BYPASS GRAFT: ICD-10-CM

## 2019-12-31 DIAGNOSIS — N18.9 CHRONIC KIDNEY DISEASE, UNSPECIFIED: ICD-10-CM

## 2019-12-31 DIAGNOSIS — N17.9 ACUTE KIDNEY FAILURE, UNSPECIFIED: ICD-10-CM

## 2019-12-31 DIAGNOSIS — A08.4 VIRAL INTESTINAL INFECTION, UNSPECIFIED: ICD-10-CM

## 2019-12-31 DIAGNOSIS — R11.2 NAUSEA WITH VOMITING, UNSPECIFIED: ICD-10-CM

## 2019-12-31 LAB
GLUCOSE BLDC GLUCOMTR-MCNC: 106 MG/DL — HIGH (ref 70–99)
GLUCOSE BLDC GLUCOMTR-MCNC: 137 MG/DL — HIGH (ref 70–99)
GLUCOSE BLDC GLUCOMTR-MCNC: 70 MG/DL — SIGNIFICANT CHANGE UP (ref 70–99)
GLUCOSE BLDC GLUCOMTR-MCNC: 94 MG/DL — SIGNIFICANT CHANGE UP (ref 70–99)

## 2019-12-31 PROCEDURE — 99232 SBSQ HOSP IP/OBS MODERATE 35: CPT

## 2019-12-31 RX ORDER — CEFTRIAXONE 500 MG/1
2000 INJECTION, POWDER, FOR SOLUTION INTRAMUSCULAR; INTRAVENOUS ONCE
Refills: 0 | Status: COMPLETED | OUTPATIENT
Start: 2019-12-31 | End: 2019-12-31

## 2019-12-31 RX ADMIN — LISINOPRIL 20 MILLIGRAM(S): 2.5 TABLET ORAL at 05:55

## 2019-12-31 RX ADMIN — CEFTRIAXONE 100 MILLIGRAM(S): 500 INJECTION, POWDER, FOR SOLUTION INTRAMUSCULAR; INTRAVENOUS at 13:44

## 2019-12-31 RX ADMIN — Medication 100 MILLIGRAM(S): at 05:55

## 2019-12-31 RX ADMIN — Medication 5 UNIT(S): at 08:14

## 2019-12-31 RX ADMIN — Medication 81 MILLIGRAM(S): at 11:21

## 2019-12-31 NOTE — DISCHARGE NOTE PROVIDER - NSFOLLOWUPCLINICS_GEN_ALL_ED_FT
[facility]  Medicine  [address], [place]   Phone: [phone]  Fax:   Follow Up Time: 2 weeks

## 2019-12-31 NOTE — DISCHARGE NOTE PROVIDER - CARE PROVIDER_API CALL
[facility] clinic,   Phone: (   )    -  Fax: (   )    -  Follow Up Time:

## 2019-12-31 NOTE — PROGRESS NOTE ADULT - SUBJECTIVE AND OBJECTIVE BOX
MILLIE, RAMIN  69y, Male    All available historical data reviewed    OVERNIGHT EVENTS:  no fevers, has no complaints  L midline in place    ROS:  General: Denies rigors, night sweats  HEENT: Denies headache, rhinorrhea, sore throat, eye pain  CV: Denies CP, palpitations  PULM: Denies wheezing, hemoptysis  GI: Denies hematemesis, hematochezia, melena  : Denies discharge, hematuria  MSK: Denies arthralgias, myalgias  SKIN: Denies rash, lesions  NEURO: Denies paresthesias, weakness  PSYCH: Denies depression, anxiety    VITALS:  T(F): 97.4, Max: 97.7 (12-30-19 @ 21:00)  HR: 65  BP: 163/71  RR: 17Vital Signs Last 24 Hrs  T(C): 36.3 (31 Dec 2019 05:50), Max: 36.5 (30 Dec 2019 21:00)  T(F): 97.4 (31 Dec 2019 05:50), Max: 97.7 (30 Dec 2019 21:00)  HR: 65 (31 Dec 2019 07:30) (55 - 65)  BP: 163/71 (31 Dec 2019 05:50) (163/71 - 173/71)  BP(mean): --  RR: 17 (31 Dec 2019 07:30) (16 - 18)  SpO2: 98% (31 Dec 2019 07:30) (98% - 98%)    TESTS & MEASUREMENTS:                        9.3    7.31  )-----------( 220      ( 30 Dec 2019 06:54 )             29.3     12-30    143  |  104  |  35<H>  ----------------------------<  67<L>  5.0   |  23  |  1.8<H>    Ca    8.8      30 Dec 2019 06:54  Mg     2.0     12-29    TPro  6.3  /  Alb  3.7  /  TBili  0.3  /  DBili  x   /  AST  22  /  ALT  23  /  AlkPhos  66  12-30    LIVER FUNCTIONS - ( 30 Dec 2019 06:54 )  Alb: 3.7 g/dL / Pro: 6.3 g/dL / ALK PHOS: 66 U/L / ALT: 23 U/L / AST: 22 U/L / GGT: x             Culture - Blood (collected 12-28-19 @ 07:26)  Source: .Blood None  Preliminary Report (12-29-19 @ 20:01):    No growth to date.    Culture - Blood (collected 12-27-19 @ 15:13)  Source: .Blood Blood-Peripheral  Preliminary Report (12-29-19 @ 01:01):    No growth to date.    Culture - Blood (collected 12-27-19 @ 15:13)  Source: .Blood Blood-Peripheral  Preliminary Report (12-29-19 @ 01:01):    No growth to date.    Culture - Urine (collected 12-25-19 @ 00:00)  Source: .Urine Clean Catch (Midstream)  Final Report (12-26-19 @ 06:38):    No growth    Culture - Blood (collected 12-24-19 @ 23:04)  Source: .Blood Blood-Venous  Gram Stain (12-26-19 @ 02:53):    Growth in anaerobic bottle: Gram Positive Cocci in Pairs and Chains  Final Report (12-26-19 @ 23:59):    Growth in anaerobic bottle: Streptococcus anginosus "Susceptibilities not    performed"    "Due to technical problems, Proteus sp. will Not be reported as part of    the BCID panel until further notice"    ***Blood Panel PCR results on this specimen are available    approximately 3 hours after the Gram stain result.***    Gram stain, PCR, and/or culture results may not always    correspond due to difference in methodologies.    ************************************************************    This PCR assay was performed using [employer].    The following targets are tested for: Enterococcus,    vancomycin resistant enterococci, Listeria monocytogenes,    coagulase negative staphylococci, S. aureus,    methicillin resistant S. aureus, Streptococcus agalactiae    (Group B), S. pneumoniae, S. pyogenes (Group A),    Acinetobacter baumannii, Enterobacter cloacae, E. coli,    Klebsiella oxytoca, K. pneumoniae, Proteus sp.,    Serratia marcescens, Haemophilus influenzae,    Neisseria meningitidis, Pseudomonas aeruginosa, Candida    albicans, C. glabrata, C krusei, C parapsilosis,    C. tropicalis and the KPC resistance gene.  Organism: Blood Culture PCR (12-26-19 @ 23:59)  Organism: Blood Culture PCR (12-26-19 @ 23:59)      -  Streptococcus sp. (Not Grp A, B or S pneumoniae): Detec      Method Type: PCR    Culture - Blood (collected 12-24-19 @ 23:04)  Source: .Blood Blood-Venous  Gram Stain (12-26-19 @ 09:51):    Growth in anaerobic bottle: Gram Positive Cocci in Pairs and Chains  Final Report (12-28-19 @ 16:31):    Growth in anaerobic bottle: Streptococcus anginosus  Organism: Streptococcus anginosus  Streptococcus anginosus (12-28-19 @ 16:31)  Organism: Streptococcus anginosus (12-28-19 @ 16:31)      -  Clindamycin: S      -  Erythromycin: S      -  Levofloxacin: S      -  Vancomycin: S      Method Type: KB  Organism: Streptococcus anginosus (12-28-19 @ 16:31)      -  Ceftriaxone: S 0.5      -  Penicillin: S 0.125      Method Type: ETEST            RADIOLOGY & ADDITIONAL TESTS:  Personal review of radiological diagnostics performed  Echo and EKG results noted when applicable.     ANTIBIOTICS:

## 2019-12-31 NOTE — DISCHARGE NOTE PROVIDER - HOSPITAL COURSE
69 year old male with PMH of HTN, DM II, DLD, CAD s/p CABG (2001) recently presented to ED for viral gastroenteritis, at that visit (12/25/19) blood cultures were drawn that came back positive for srep Shimajoanaus parker was called back  for further work up. 2D echo showed possible aortic valve vegetation however FIORELLA was negative for Endocarditis. Pt also complained of dental pain initially when his symptoms began so Dental evaluation was obtained which was negative for any dental caries. as per dental consult: no history of trauma. The swelling is unlikely odontogenic. Firm swelling on buccal near #26,27. Informed patient that he needs to see an oral surgeon for biopsy or possible culture of the swelling if drainage is achieved. Patient understands and will see his oral surgeon for the biopsy. As per ID rec, pt will be discharged home with midline in place for Rocephin 2 gm iv q24h end date 1/10/20. Pt is clinically stable to be discharged home.

## 2019-12-31 NOTE — DISCHARGE NOTE PROVIDER - NSDCFUADDINST_GEN_ALL_CORE_FT
Please follow up with your PCP within 2 weeks of hospital discharge. You refused to go to [facility]. However, I have still provided you with contact information in case you are unsuccessful in finding a new primary care provider.     Please also follow up with your oral surgeon as per inpatient dentist recommendation.

## 2019-12-31 NOTE — PROGRESS NOTE ADULT - SUBJECTIVE AND OBJECTIVE BOX
MILLIE RAMIN  69y  Male      Patient is a 69y old  Male who presents with a chief complaint of Bacteremia (31 Dec 2019 07:45)      INTERVAL HPI/OVERNIGHT EVENTS:      ******************************* REVIEW OF SYSTEMS:**********************************************      All other review of systems negative    *********************** VITALS ******************************************    T(F): 97.4 (12-31-19 @ 05:50)  HR: 64 (12-31-19 @ 10:45) (55 - 65)  BP: 112/54 (12-31-19 @ 10:45) (112/54 - 173/71)  RR: 18 (12-31-19 @ 10:45) (16 - 18)  SpO2: 100% (12-31-19 @ 10:45) (98% - 100%)    12-30-19 @ 07:01  -  12-31-19 @ 07:00  --------------------------------------------------------  IN: 240 mL / OUT: 0 mL / NET: 240 mL            12-30-19 @ 07:01  -  12-31-19 @ 07:00  --------------------------------------------------------  IN: 240 mL / OUT: 0 mL / NET: 240 mL        ******************************** PHYSICAL EXAM:**************************************************  GENERAL: NAD    PSYCH: no agitation, baseline mentation  HEENT:     NERVOUS SYSTEM:  Alert & Oriented X3,     PULMONARY: CATIE, CTA    CARDIOVASCULAR: S1S2 RRR    GI: Soft, NT, ND; BS present.    EXTREMITIES:  2+ Peripheral Pulses, No clubbing, cyanosis, or edema    LYMPH: No lymphadenopathy noted    SKIN: No rashes or lesions    ******************************************************************************************    resting in bed.     **************************** LABS *******************************************************                          9.3    7.31  )-----------( 220      ( 30 Dec 2019 06:54 )             29.3     12-30    143  |  104  |  35<H>  ----------------------------<  67<L>  5.0   |  23  |  1.8<H>    Ca    8.8      30 Dec 2019 06:54  Mg     2.0     12-29    TPro  6.3  /  Alb  3.7  /  TBili  0.3  /  DBili  x   /  AST  22  /  ALT  23  /  AlkPhos  66  12-30        PT/INR - ( 29 Dec 2019 23:17 )   PT: 12.30 sec;   INR: 1.07 ratio         PTT - ( 29 Dec 2019 23:17 )  PTT:28.2 sec  Lactate Trend        CAPILLARY BLOOD GLUCOSE      POCT Blood Glucose.: 137 mg/dL (31 Dec 2019 11:21)          **************************Active Medications *******************************************  No Known Allergies      aspirin enteric coated 81 milliGRAM(s) Oral daily  chlorhexidine 4% Liquid 1 Application(s) Topical <User Schedule>  dextrose 40% Gel 15 Gram(s) Oral once PRN  dextrose 5%. 1000 milliLiter(s) IV Continuous <Continuous>  dextrose 50% Injectable 12.5 Gram(s) IV Push once  dextrose 50% Injectable 25 Gram(s) IV Push once  dextrose 50% Injectable 25 Gram(s) IV Push once  glucagon  Injectable 1 milliGRAM(s) IntraMuscular once PRN  heparin  Injectable 5000 Unit(s) SubCutaneous every 12 hours  influenza   Vaccine 0.5 milliLiter(s) IntraMuscular once  insulin glargine Injectable (LANTUS) 28 Unit(s) SubCutaneous at bedtime  insulin lispro (HumaLOG) corrective regimen sliding scale   SubCutaneous three times a day before meals  insulin lispro Injectable (HumaLOG) 5 Unit(s) SubCutaneous three times a day before meals  lisinopril 20 milliGRAM(s) Oral daily  metoprolol succinate  milliGRAM(s) Oral daily  simvastatin 40 milliGRAM(s) Oral at bedtime      ***************************************************  RADIOLOGY & ADDITIONAL TESTS:    Imaging Personally Reviewed:  [ ] YES  [ ] NO    HEALTH ISSUES - PROBLEM Dx:

## 2019-12-31 NOTE — DISCHARGE NOTE PROVIDER - NSDCFUSCHEDAPPT_GEN_ALL_CORE_FT
RAMIN PINTO ; 01/17/2020 ; [facility] [address] RAMIN PINTO ; 01/17/2020 ; [facility] [address] RAMIN PINTO ; 01/17/2020 ; [facility] [address]

## 2019-12-31 NOTE — DISCHARGE NOTE PROVIDER - NSDCMRMEDTOKEN_GEN_ALL_CORE_FT
Aspir 81 oral delayed release tablet: 1 tab(s) orally once a day  lisinopril 20 mg oral tablet: 1 tab(s) orally once a day  metoprolol succinate 100 mg oral tablet, extended release: 1 tab(s) orally once a day  NovoLOG FlexPen 100 units/mL injectable solution: 5 unit(s) injectable 2 times a day (with meals), As Needed  simvastatin 40 mg oral tablet: 1 tab(s) orally once a day (at bedtime)  Tresiba 100 units/mL subcutaneous solution: 28 unit(s) subcutaneous once a day (at bedtime)

## 2019-12-31 NOTE — PROGRESS NOTE ADULT - ASSESSMENT
· Assessment		   69y old  Male who presents with a chief complaint of chills. Found  to have positive blood cultures, called back for re-admission.     IMPRESSION:  Strep anginosus bacteremia secondary to possible dental origin  FIORELLA Mild AI ans MR no clear evidence of endocarditis   Dental evaluation: no ongoing issues   No sepsis  BCx 12/24 S anginosus  BCx 12/27,28 NG    RECOMMENDATIONS:  Rocephin 2 gm iv q24h x 14 days from cleared BCX as FIORELLA no endocarditis  Rocephin till 1/10  f/u with Dr Montenegro [id number] at [address] on tuesday 1/7 at 11 am  Recall prn please

## 2019-12-31 NOTE — PROGRESS NOTE ADULT - ASSESSMENT
69 year old male with PMH of HTN, DM II, DLD, CAD s/p CABG (2001) recently presented to ED for viral gastroenteritis, at that visit (12/25/19) blood cultures were drawn positive for -  Streptococcus sp. (Not Grp A, B or S pneumoniae): Detec (12.24.19 @ 23:04), patient was called back  for further work up.     # Positive blood cultures with Strep Anginus and possible Aortic valve vegetation  - currently on  Rocephin  -2D Echo- possible aortic valve vegetation  - repeat blood cx 12/27 and 12/28 negative  - s/p  FIORELLA today  - negative for Endocarditis.   - dental eval noted from 12/30 >> f/u with oral surgeon as outpt for buccal mucosa swelling bx.   - currently on  Rocephin 2 gm iv q24h.    s/p Midline yesterday.     IV abx via midline till Sergey 10. 2010.      # CKD likely secondary to DM  -Cr 1.6 (unknown baseline), stable and downtrending  -Monitor BMP    # DM II  -Continue with Insulin regimen.   -Monitor Fingerstick glucose    # HTN   - Continue Toprol and Lisinopril    # DLD   - Continue Simvastatin    # CAD s/p CABG   - Continue ASA    Activity: IAT  Diet: DASH/TLC  GI/DVT: no protonix, on hep  Dispo: FIORELLA and dental clinic appt  Code: full    #Progress Note Handoff  Pending (specify):   Family discussion:  Disposition: Home_ today.

## 2019-12-31 NOTE — DISCHARGE NOTE PROVIDER - NSDCCPCAREPLAN_GEN_ALL_CORE_FT
PRINCIPAL DISCHARGE DIAGNOSIS  Diagnosis: Bacteremia  Assessment and Plan of Treatment:       SECONDARY DISCHARGE DIAGNOSES  Diagnosis: Need for dental care  Assessment and Plan of Treatment: As per inpatient dentist evaluation there was firm swelling on buccal near #26,27. You need  to see an oral surgeon for biopsy or possible culture of the swelling if drainage is achieved. You also need to see your dentist for comprehensive dental care as well. PRINCIPAL DISCHARGE DIAGNOSIS  Diagnosis: Bacteremia  Assessment and Plan of Treatment: You were called in for admission because you were found to have bacteria in your blood. You have been started on IV antibiotics treatment for this. Repeat blood cultures following starting of the treatment has been negative. However, you are still to continue IV antibiotics outpatient. Visiting nurse services will be coming to give you the treatment. End date 1/10/20.      SECONDARY DISCHARGE DIAGNOSES  Diagnosis: Need for dental care  Assessment and Plan of Treatment: As per inpatient dentist evaluation there was firm swelling on buccal near #26,27. You need  to see an oral surgeon for biopsy or possible culture of the swelling if drainage is achieved. You also need to see your dentist for comprehensive dental care as well. PRINCIPAL DISCHARGE DIAGNOSIS  Diagnosis: Bacteremia  Assessment and Plan of Treatment: You were called in for admission because you were found to have bacteria in your blood. You have been started on IV antibiotics treatment for this. Repeat blood cultures following starting of the treatment has been negative. However, you are still to continue IV antibiotics outpatient. Visiting nurse services will be coming to give you the treatment. End date 1/10/20.      SECONDARY DISCHARGE DIAGNOSES  Diagnosis: Diabetes mellitus  Assessment and Plan of Treatment: Your blood sugars has not been controlled well. Your hemoglobin A1C was 7.9 inpatient. Please follow up with your Primary care provider for better management of your blood sugars.    Diagnosis: Need for dental care  Assessment and Plan of Treatment: As per inpatient dentist evaluation there was firm swelling on buccal near #26,27. You need  to see an oral surgeon for biopsy or possible culture of the swelling if drainage is achieved. You also need to see your dentist for comprehensive dental care as well.

## 2020-01-02 LAB
CULTURE RESULTS: SIGNIFICANT CHANGE UP
SPECIMEN SOURCE: SIGNIFICANT CHANGE UP

## 2020-01-06 DIAGNOSIS — R78.81 BACTEREMIA: ICD-10-CM

## 2020-01-06 DIAGNOSIS — I12.9 HYPERTENSIVE CHRONIC KIDNEY DISEASE WITH STAGE 1 THROUGH STAGE 4 CHRONIC KIDNEY DISEASE, OR UNSPECIFIED CHRONIC KIDNEY DISEASE: ICD-10-CM

## 2020-01-06 DIAGNOSIS — Z79.82 LONG TERM (CURRENT) USE OF ASPIRIN: ICD-10-CM

## 2020-01-06 DIAGNOSIS — Z79.4 LONG TERM (CURRENT) USE OF INSULIN: ICD-10-CM

## 2020-01-06 DIAGNOSIS — I25.810 ATHEROSCLEROSIS OF CORONARY ARTERY BYPASS GRAFT(S) WITHOUT ANGINA PECTORIS: ICD-10-CM

## 2020-01-06 DIAGNOSIS — E78.5 HYPERLIPIDEMIA, UNSPECIFIED: ICD-10-CM

## 2020-01-06 DIAGNOSIS — Z95.1 PRESENCE OF AORTOCORONARY BYPASS GRAFT: ICD-10-CM

## 2020-01-06 DIAGNOSIS — N18.9 CHRONIC KIDNEY DISEASE, UNSPECIFIED: ICD-10-CM

## 2020-01-06 DIAGNOSIS — B95.4 OTHER STREPTOCOCCUS AS THE CAUSE OF DISEASES CLASSIFIED ELSEWHERE: ICD-10-CM

## 2020-01-06 DIAGNOSIS — E11.22 TYPE 2 DIABETES MELLITUS WITH DIABETIC CHRONIC KIDNEY DISEASE: ICD-10-CM

## 2020-01-17 ENCOUNTER — APPOINTMENT (OUTPATIENT)
Dept: CARDIOLOGY | Facility: CLINIC | Age: 70
End: 2020-01-17
Payer: MEDICARE

## 2020-01-17 PROCEDURE — 93000 ELECTROCARDIOGRAM COMPLETE: CPT

## 2020-01-17 PROCEDURE — 99214 OFFICE O/P EST MOD 30 MIN: CPT

## 2020-07-16 ENCOUNTER — RECORD ABSTRACTING (OUTPATIENT)
Age: 70
End: 2020-07-16

## 2020-07-16 DIAGNOSIS — Z87.898 PERSONAL HISTORY OF OTHER SPECIFIED CONDITIONS: ICD-10-CM

## 2020-07-16 RX ORDER — ASPIRIN 81 MG
81 TABLET, DELAYED RELEASE (ENTERIC COATED) ORAL DAILY
Refills: 0 | Status: ACTIVE | COMMUNITY

## 2020-07-16 RX ORDER — INSULIN ASPART 100 [IU]/ML
100 INJECTION, SOLUTION INTRAVENOUS; SUBCUTANEOUS
Refills: 0 | Status: ACTIVE | COMMUNITY

## 2020-09-18 ENCOUNTER — APPOINTMENT (OUTPATIENT)
Dept: CARDIOLOGY | Facility: CLINIC | Age: 70
End: 2020-09-18
Payer: MEDICARE

## 2020-09-18 VITALS
HEIGHT: 65 IN | WEIGHT: 164 LBS | HEART RATE: 61 BPM | TEMPERATURE: 98.2 F | BODY MASS INDEX: 27.32 KG/M2 | DIASTOLIC BLOOD PRESSURE: 68 MMHG | SYSTOLIC BLOOD PRESSURE: 136 MMHG

## 2020-09-18 DIAGNOSIS — I34.0 NONRHEUMATIC MITRAL (VALVE) INSUFFICIENCY: ICD-10-CM

## 2020-09-18 DIAGNOSIS — I36.0 NONRHEUMATIC TRICUSPID (VALVE) STENOSIS: ICD-10-CM

## 2020-09-18 PROCEDURE — 93000 ELECTROCARDIOGRAM COMPLETE: CPT

## 2020-09-18 PROCEDURE — 99214 OFFICE O/P EST MOD 30 MIN: CPT

## 2020-09-18 RX ORDER — METOPROLOL SUCCINATE 100 MG/1
100 TABLET, EXTENDED RELEASE ORAL DAILY
Refills: 0 | Status: ACTIVE | COMMUNITY

## 2020-09-18 RX ORDER — NITROGLYCERIN 0.4 MG/1
0.4 TABLET SUBLINGUAL
Qty: 100 | Refills: 6 | Status: ACTIVE | COMMUNITY
Start: 2020-09-18 | End: 1900-01-01

## 2020-09-18 RX ORDER — RABEPRAZOLE SODIUM 20 MG/1
20 TABLET, DELAYED RELEASE ORAL
Refills: 0 | Status: DISCONTINUED | COMMUNITY
End: 2020-09-18

## 2020-09-18 RX ORDER — SIMVASTATIN 40 MG/1
40 TABLET, FILM COATED ORAL
Refills: 0 | Status: ACTIVE | COMMUNITY

## 2020-09-18 NOTE — PHYSICAL EXAM
[General Appearance - Well Developed] : well developed [General Appearance - Well Nourished] : well nourished [No Deformities] : no deformities [General Appearance - In No Acute Distress] : no acute distress [Normal Conjunctiva] : the conjunctiva exhibited no abnormalities [Normal Oropharynx] : normal oropharynx [Normal Jugular Venous V Waves Present] : normal jugular venous V waves present [Respiration, Rhythm And Depth] : normal respiratory rhythm and effort [Heart Rate And Rhythm] : heart rate and rhythm were normal [Heart Sounds] : normal S1 and S2 [Arterial Pulses Normal] : the arterial pulses were normal [Edema] : no peripheral edema present [FreeTextEntry1] : Grade II/VI systolic murmur [Abdomen Soft] : soft [Abdomen Tenderness] : non-tender [Abnormal Walk] : normal gait [Nail Clubbing] : no clubbing of the fingernails [Cyanosis, Localized] : no localized cyanosis [Skin Turgor] : normal skin turgor [] : no rash [No Venous Stasis] : no venous stasis [Oriented To Time, Place, And Person] : oriented to person, place, and time [Affect] : the affect was normal

## 2020-09-18 NOTE — ASSESSMENT
[FreeTextEntry1] : ASHD\par S/P CABG\par Angina\par Hyperlipidemia\par Mild MR\par Mild AI\par Mild TR

## 2020-09-18 NOTE — HISTORY OF PRESENT ILLNESS
[FreeTextEntry1] : S/P CABG\par Valvular insufficiency\par Mild AI/MR/TR\par Normal LV systolic function

## 2020-09-18 NOTE — REASON FOR VISIT
[FreeTextEntry1] : Patient presents for evaluation due to exertional chest pressure and symptoms of dyspnea while walking up hills.

## 2020-09-18 NOTE — DISCUSSION/SUMMARY
[FreeTextEntry1] : Exercise stress thallium\par 2D echo doppler\par Patient was instructed to target their T. Cholesterol to less than 200 mg/dl and LDL cholesterol to less than 70 mg/dl.\par Exercise and weight loss was advised.\par Maintain cardiac medications. \par Patient was advised to repeat a BMP, CBC , fasting lipid profile and hepatic panel.\par RV in 2 weeks.\par Patient was advised to call me if his CP were to recur and to go to the ER if it does not subside.\par

## 2020-10-12 ENCOUNTER — APPOINTMENT (OUTPATIENT)
Dept: CARDIOLOGY | Facility: CLINIC | Age: 70
End: 2020-10-12
Payer: MEDICARE

## 2020-10-12 PROCEDURE — 93306 TTE W/DOPPLER COMPLETE: CPT

## 2021-02-13 NOTE — ED ADULT NURSE NOTE - DISCHARGE DATE/TIME
Pt comes to ED after having am elbow injury from falling back while snowboarding. Pt believes she dislocated her elbow. Pt is able to move her fingers, pulse present.   10-Brian-2019 19:21

## 2021-02-15 ENCOUNTER — OUTPATIENT (OUTPATIENT)
Dept: OUTPATIENT SERVICES | Facility: HOSPITAL | Age: 71
LOS: 1 days | Discharge: HOME | End: 2021-02-15

## 2021-02-15 ENCOUNTER — LABORATORY RESULT (OUTPATIENT)
Age: 71
End: 2021-02-15

## 2021-02-15 DIAGNOSIS — Z11.59 ENCOUNTER FOR SCREENING FOR OTHER VIRAL DISEASES: ICD-10-CM

## 2021-02-15 DIAGNOSIS — Z95.1 PRESENCE OF AORTOCORONARY BYPASS GRAFT: Chronic | ICD-10-CM

## 2021-02-15 DIAGNOSIS — Z90.49 ACQUIRED ABSENCE OF OTHER SPECIFIED PARTS OF DIGESTIVE TRACT: Chronic | ICD-10-CM

## 2021-02-23 NOTE — PATIENT PROFILE ADULT - FLU SEASON?
What Type Of Note Output Would You Prefer (Optional)?: Bullet Format How Severe Is Your Acne?: mild Is This A New Presentation, Or A Follow-Up?: Acne Additional Comments (Use Complete Sentences): Patient with worsening facial inflammatory acne for the past several months. She has tried numerous over-the-counter medication‘s with little to no benefit. She does feel that much of her acne is likely hormonal and is interested in considering birth control pills Yes...

## 2021-02-28 ENCOUNTER — LABORATORY RESULT (OUTPATIENT)
Age: 71
End: 2021-02-28

## 2021-02-28 ENCOUNTER — OUTPATIENT (OUTPATIENT)
Dept: OUTPATIENT SERVICES | Facility: HOSPITAL | Age: 71
LOS: 1 days | Discharge: HOME | End: 2021-02-28

## 2021-02-28 DIAGNOSIS — Z90.49 ACQUIRED ABSENCE OF OTHER SPECIFIED PARTS OF DIGESTIVE TRACT: Chronic | ICD-10-CM

## 2021-02-28 DIAGNOSIS — Z95.1 PRESENCE OF AORTOCORONARY BYPASS GRAFT: Chronic | ICD-10-CM

## 2021-02-28 DIAGNOSIS — Z11.59 ENCOUNTER FOR SCREENING FOR OTHER VIRAL DISEASES: ICD-10-CM

## 2021-03-03 ENCOUNTER — RESULT REVIEW (OUTPATIENT)
Age: 71
End: 2021-03-03

## 2021-03-03 ENCOUNTER — OUTPATIENT (OUTPATIENT)
Dept: OUTPATIENT SERVICES | Facility: HOSPITAL | Age: 71
LOS: 1 days | Discharge: HOME | End: 2021-03-03
Payer: MEDICARE

## 2021-03-03 DIAGNOSIS — R07.9 CHEST PAIN, UNSPECIFIED: ICD-10-CM

## 2021-03-03 DIAGNOSIS — Z95.1 PRESENCE OF AORTOCORONARY BYPASS GRAFT: Chronic | ICD-10-CM

## 2021-03-03 DIAGNOSIS — Z90.49 ACQUIRED ABSENCE OF OTHER SPECIFIED PARTS OF DIGESTIVE TRACT: Chronic | ICD-10-CM

## 2021-03-03 PROCEDURE — 78452 HT MUSCLE IMAGE SPECT MULT: CPT | Mod: 26

## 2021-03-03 PROCEDURE — 93016 CV STRESS TEST SUPVJ ONLY: CPT

## 2021-03-03 PROCEDURE — 93018 CV STRESS TEST I&R ONLY: CPT

## 2021-03-04 ENCOUNTER — APPOINTMENT (OUTPATIENT)
Dept: CARDIOLOGY | Facility: CLINIC | Age: 71
End: 2021-03-04
Payer: MEDICARE

## 2021-03-04 VITALS
HEART RATE: 70 BPM | TEMPERATURE: 98.4 F | BODY MASS INDEX: 26.66 KG/M2 | HEIGHT: 65 IN | DIASTOLIC BLOOD PRESSURE: 70 MMHG | SYSTOLIC BLOOD PRESSURE: 145 MMHG | WEIGHT: 160 LBS

## 2021-03-04 DIAGNOSIS — R94.39 ABNORMAL RESULT OF OTHER CARDIOVASCULAR FUNCTION STUDY: ICD-10-CM

## 2021-03-04 PROCEDURE — 93000 ELECTROCARDIOGRAM COMPLETE: CPT

## 2021-03-04 PROCEDURE — 99072 ADDL SUPL MATRL&STAF TM PHE: CPT

## 2021-03-04 PROCEDURE — 99214 OFFICE O/P EST MOD 30 MIN: CPT

## 2021-03-04 NOTE — DISCUSSION/SUMMARY
[FreeTextEntry1] : Exercise stress thallium revealed myocardial ischemia in 2 regions.\par Option to proceed with a LHC/SCA/LV gram was discussed with the patient in detail. All the risks and benefits of the procedure were explained in detail including but not limited to death, MI, CVA, arrhythmia, TIA, CVA, bleeding, infection, nephropathy, emergency surgery.\par The patient understands and consents for the procedure.\par Patient was instructed to target their T. Cholesterol to less than 200 mg/dl and LDL cholesterol to less than 70 mg/dl.\par Exercise and weight loss was advised.\par Maintain cardiac medications. \par Patient was advised to repeat a BMP, CBC , fasting lipid profile and hepatic panel.\par RV in 2 weeks.\par Patient was advised to call me if his CP were to recur and to go to the ER if it does not subside.\par

## 2021-03-04 NOTE — PHYSICAL EXAM
[General Appearance - Well Developed] : well developed [General Appearance - Well Nourished] : well nourished [No Deformities] : no deformities [General Appearance - In No Acute Distress] : no acute distress [Normal Conjunctiva] : the conjunctiva exhibited no abnormalities [Normal Oropharynx] : normal oropharynx [Normal Jugular Venous V Waves Present] : normal jugular venous V waves present [Respiration, Rhythm And Depth] : normal respiratory rhythm and effort [Heart Rate And Rhythm] : heart rate and rhythm were normal [Heart Sounds] : normal S1 and S2 [Arterial Pulses Normal] : the arterial pulses were normal [Edema] : no peripheral edema present [Abdomen Soft] : soft [Abdomen Tenderness] : non-tender [Abnormal Walk] : normal gait [Nail Clubbing] : no clubbing of the fingernails [Cyanosis, Localized] : no localized cyanosis [Skin Turgor] : normal skin turgor [] : no rash [No Venous Stasis] : no venous stasis [Oriented To Time, Place, And Person] : oriented to person, place, and time [Affect] : the affect was normal [FreeTextEntry1] : Grade II/VI systolic murmur

## 2021-03-14 ENCOUNTER — LABORATORY RESULT (OUTPATIENT)
Age: 71
End: 2021-03-14

## 2021-03-14 ENCOUNTER — OUTPATIENT (OUTPATIENT)
Dept: OUTPATIENT SERVICES | Facility: HOSPITAL | Age: 71
LOS: 1 days | Discharge: HOME | End: 2021-03-14

## 2021-03-14 DIAGNOSIS — Z90.49 ACQUIRED ABSENCE OF OTHER SPECIFIED PARTS OF DIGESTIVE TRACT: Chronic | ICD-10-CM

## 2021-03-14 DIAGNOSIS — Z95.1 PRESENCE OF AORTOCORONARY BYPASS GRAFT: Chronic | ICD-10-CM

## 2021-03-14 DIAGNOSIS — Z11.59 ENCOUNTER FOR SCREENING FOR OTHER VIRAL DISEASES: ICD-10-CM

## 2021-03-15 ENCOUNTER — LABORATORY RESULT (OUTPATIENT)
Age: 71
End: 2021-03-15

## 2021-03-16 ENCOUNTER — LABORATORY RESULT (OUTPATIENT)
Age: 71
End: 2021-03-16

## 2021-03-17 ENCOUNTER — OUTPATIENT (OUTPATIENT)
Dept: OUTPATIENT SERVICES | Facility: HOSPITAL | Age: 71
LOS: 1 days | Discharge: HOME | End: 2021-03-17
Payer: MEDICARE

## 2021-03-17 VITALS
RESPIRATION RATE: 18 BRPM | WEIGHT: 160.06 LBS | SYSTOLIC BLOOD PRESSURE: 178 MMHG | DIASTOLIC BLOOD PRESSURE: 77 MMHG | HEART RATE: 59 BPM | OXYGEN SATURATION: 100 % | HEIGHT: 65 IN

## 2021-03-17 DIAGNOSIS — Z90.49 ACQUIRED ABSENCE OF OTHER SPECIFIED PARTS OF DIGESTIVE TRACT: Chronic | ICD-10-CM

## 2021-03-17 DIAGNOSIS — Z98.890 OTHER SPECIFIED POSTPROCEDURAL STATES: Chronic | ICD-10-CM

## 2021-03-17 DIAGNOSIS — Z95.1 PRESENCE OF AORTOCORONARY BYPASS GRAFT: Chronic | ICD-10-CM

## 2021-03-17 LAB
ANION GAP SERPL CALC-SCNC: 12 MMOL/L — SIGNIFICANT CHANGE UP (ref 7–14)
BUN SERPL-MCNC: 51 MG/DL — HIGH (ref 10–20)
CALCIUM SERPL-MCNC: 9.4 MG/DL — SIGNIFICANT CHANGE UP (ref 8.5–10.1)
CHLORIDE SERPL-SCNC: 102 MMOL/L — SIGNIFICANT CHANGE UP (ref 98–110)
CO2 SERPL-SCNC: 22 MMOL/L — SIGNIFICANT CHANGE UP (ref 17–32)
CREAT SERPL-MCNC: 1.8 MG/DL — HIGH (ref 0.7–1.5)
GLUCOSE BLDC GLUCOMTR-MCNC: 150 MG/DL — HIGH (ref 70–99)
GLUCOSE BLDC GLUCOMTR-MCNC: 333 MG/DL — HIGH (ref 70–99)
GLUCOSE SERPL-MCNC: 161 MG/DL — HIGH (ref 70–99)
HCT VFR BLD CALC: 35.9 % — LOW (ref 42–52)
HGB BLD-MCNC: 11.4 G/DL — LOW (ref 14–18)
MCHC RBC-ENTMCNC: 28.8 PG — SIGNIFICANT CHANGE UP (ref 27–31)
MCHC RBC-ENTMCNC: 31.8 G/DL — LOW (ref 32–37)
MCV RBC AUTO: 90.7 FL — SIGNIFICANT CHANGE UP (ref 80–94)
NRBC # BLD: 0 /100 WBCS — SIGNIFICANT CHANGE UP (ref 0–0)
PLATELET # BLD AUTO: 267 K/UL — SIGNIFICANT CHANGE UP (ref 130–400)
POTASSIUM SERPL-MCNC: 5.5 MMOL/L — HIGH (ref 3.5–5)
POTASSIUM SERPL-SCNC: 5.5 MMOL/L — HIGH (ref 3.5–5)
RBC # BLD: 3.96 M/UL — LOW (ref 4.7–6.1)
RBC # FLD: 11.8 % — SIGNIFICANT CHANGE UP (ref 11.5–14.5)
SODIUM SERPL-SCNC: 136 MMOL/L — SIGNIFICANT CHANGE UP (ref 135–146)
WBC # BLD: 10.85 K/UL — HIGH (ref 4.8–10.8)
WBC # FLD AUTO: 10.85 K/UL — HIGH (ref 4.8–10.8)

## 2021-03-17 PROCEDURE — 93459 L HRT ART/GRFT ANGIO: CPT | Mod: 26

## 2021-03-17 RX ORDER — ASPIRIN/CALCIUM CARB/MAGNESIUM 324 MG
1 TABLET ORAL
Qty: 0 | Refills: 0 | DISCHARGE

## 2021-03-17 RX ORDER — METOPROLOL TARTRATE 50 MG
1 TABLET ORAL
Qty: 0 | Refills: 0 | DISCHARGE

## 2021-03-17 RX ORDER — SIMVASTATIN 20 MG/1
1 TABLET, FILM COATED ORAL
Qty: 0 | Refills: 0 | DISCHARGE

## 2021-03-17 RX ORDER — INSULIN ASPART 100 [IU]/ML
5 INJECTION, SOLUTION SUBCUTANEOUS
Qty: 0 | Refills: 0 | DISCHARGE

## 2021-03-17 RX ORDER — LISINOPRIL 2.5 MG/1
1 TABLET ORAL
Qty: 0 | Refills: 0 | DISCHARGE

## 2021-03-17 RX ORDER — INSULIN DEGLUDEC 100 U/ML
28 INJECTION, SOLUTION SUBCUTANEOUS
Qty: 0 | Refills: 0 | DISCHARGE

## 2021-03-17 NOTE — H&P CARDIOLOGY - PMH
Blindness of left eye with normal vision in contralateral eye    CAD (coronary artery disease)    HTN (hypertension)    Hyperlipidemia    Stage 3b chronic kidney disease    Type 2 diabetes mellitus

## 2021-03-17 NOTE — H&P CARDIOLOGY - HISTORY OF PRESENT ILLNESS
69 y/o male presents here today for [facility] d/t c/o CP and + NST showing Two small-size reversible defects in the inferolateral and anteroseptal basal wall of the left ventricle consistent with ischemia.    Pre cath note:    indication:  [ ] STEMI                [ ] NSTEMI                 [ ] Acute coronary syndrome                     [ ]Unstable Angina   [ ] high risk  [ ] intermediate risk  [ ] low risk                     [ ] Stable Angina     non-invasive testing: +NST                         Date:      3/3               result: [ ] high risk  [x ] intermediate risk  [ ] low risk    Anti- Anginal medications:                    [ ] not used                       [x ] used                   [ ] not used but strong indication not to use    Ejection Fraction                   [ ] <29            [ ] 30-39%   [ ] 40-49%     [x ]>50%    CHF                   [ ] active (within last 14 days on meds   [ ] Chronic (on meds but no exacerbation)    COPD                   [ ] mild (on chronic bronchodilators)  [ ] moderate (on chronic steroid therapy)      [ ] severe (indication for home O2 or PACO2 >50)    Other risk factors:                       [ ] Previous MI                     [ ] CVA/ stroke                    [ ] carotid stent/ CEA                    [ ] PVD/PAD- (arterial aneurysm, non-palpable pulses, tortuous vessel with inability to insert catheter, infra-renal dissection, renal or subclavian artery stenosis)                    [x ] diabetic                    [x ] previous CABG                    [ ] Renal Failure                           11.4   10.85 )-----------( 267      ( 17 Mar 2021 11:40 )             35.9     LEFT RADIAL ARTERY EVALUATION:  RAHEL TEST: WNL    Adjusted CathPCI Bleeding Event Risk: 4.4%

## 2021-03-17 NOTE — ASU PATIENT PROFILE, ADULT - PMH
CAD (coronary artery disease)    HTN (hypertension)    Hyperlipidemia    Type 2 diabetes mellitus

## 2021-03-17 NOTE — CHART NOTE - NSCHARTNOTEFT_GEN_A_CORE
PRE-OP DIAGNOSIS: abnormal stress test, CAD s/p CABG LIMA to LAD , SVG to Diag, SVG to RPDA, HTN, DM, DL.     PROCEDURE: [facility] with coronary angiography    Physician: Dr Hurt  Assistant: vandana Benavides    ANESTHESIA TYPE:  [  ]General Anesthesia  [  ] Sedation  [ x ] Local/Regional    ESTIMATED BLOOD LOSS:    10   mL    CONDITION  [  ] Critical  [  ] Serious  [  ]Fair  [ x ]Good      SPECIMENS REMOVED (IF APPLICABLE): N/A      IV CONTRAST:     80        mL      IMPLANTS (IF APPLICABLE)      FINDINGS    Left Heart Catheterization:    LVEDP: normal     LEFT HEART CATHETERIZATION                                    Left main Distal 90%    LAD: Prox 100% , distal small vessel diffuse disease, supplied by patent LIMA                        Diag: supplied by patent     Left Circumflex: severe disease,   OM 99% small      Right Coronary Artery: Mid 80% , distal 90%   RPDA supplied by patent SVG    patent LIMA to LAD  patent SVG to Diag  patent SVG to RPDA     DOMINANCE: Right    ACCESS: right femoral   CLOSURE: Angioseal       POST-OP DIAGNOSIS  Triple vessel disease, patent grafts         PLAN OF CARE  [x ] D/C Home today  continue home medication   IV hydration   follow up as outpatient     Results of procedure/ plan of care discussed with patient/  in detail. PRE-OP DIAGNOSIS: abnormal stress test, CAD s/p CABG LIMA to LAD , SVG to Diag, SVG to RPDA, HTN, DM, DL.     PROCEDURE: [facility] with coronary angiography    Physician: Dr Blu BUCKLEY FAC  Assistant: vandana Benavides    ANESTHESIA TYPE:  [  ]General Anesthesia  [  ] Sedation  [ x ] Local/Regional    ESTIMATED BLOOD LOSS:    10   mL    CONDITION  [  ] Critical  [  ] Serious  [  ]Fair  [ x ]Good      SPECIMENS REMOVED (IF APPLICABLE): N/A      IV CONTRAST:     60        mL      IMPLANTS (IF APPLICABLE)      FINDINGS    Left Heart Catheterization:    LVEDP: normal     LEFT HEART CATHETERIZATION                                    Left main Distal 90%    LAD: Prox 100% , distal small vessel diffuse disease, supplied by patent LIMA                        Diag: supplied by patent  reverse saphenous graft    Left Circumflex: severe disease,   OM 99% small vessel      Right Coronary Artery: Mid 80% , distal 90%   RPDA supplied by patent reverse SVG    patent LIMA to LAD  patent r SVG to Diag  patent r SVG to RPDA     DOMINANCE: Right    ACCESS: right femoral   CLOSURE: Angioseal       POST-OP DIAGNOSIS  Triple vessel disease, patent grafts         PLAN OF CARE  [x ] D/C Home today  continue home medication   IV hydration   follow up as outpatient     Results of procedure/ plan of care discussed with patient/  in detail.

## 2021-03-18 PROBLEM — N18.32 CHRONIC KIDNEY DISEASE, STAGE 3B: Chronic | Status: ACTIVE | Noted: 2021-03-17

## 2021-03-18 PROBLEM — H54.40 BLINDNESS, ONE EYE, UNSPECIFIED EYE: Chronic | Status: ACTIVE | Noted: 2021-03-17

## 2021-03-24 DIAGNOSIS — E78.5 HYPERLIPIDEMIA, UNSPECIFIED: ICD-10-CM

## 2021-03-24 DIAGNOSIS — I12.9 HYPERTENSIVE CHRONIC KIDNEY DISEASE WITH STAGE 1 THROUGH STAGE 4 CHRONIC KIDNEY DISEASE, OR UNSPECIFIED CHRONIC KIDNEY DISEASE: ICD-10-CM

## 2021-03-24 DIAGNOSIS — E11.22 TYPE 2 DIABETES MELLITUS WITH DIABETIC CHRONIC KIDNEY DISEASE: ICD-10-CM

## 2021-03-24 DIAGNOSIS — R07.89 OTHER CHEST PAIN: ICD-10-CM

## 2021-03-24 DIAGNOSIS — Z79.4 LONG TERM (CURRENT) USE OF INSULIN: ICD-10-CM

## 2021-03-24 DIAGNOSIS — Z95.1 PRESENCE OF AORTOCORONARY BYPASS GRAFT: ICD-10-CM

## 2021-03-24 DIAGNOSIS — I25.10 ATHEROSCLEROTIC HEART DISEASE OF NATIVE CORONARY ARTERY WITHOUT ANGINA PECTORIS: ICD-10-CM

## 2021-03-24 DIAGNOSIS — N18.32 CHRONIC KIDNEY DISEASE, STAGE 3B: ICD-10-CM

## 2021-03-24 DIAGNOSIS — Z79.82 LONG TERM (CURRENT) USE OF ASPIRIN: ICD-10-CM

## 2021-03-29 ENCOUNTER — APPOINTMENT (OUTPATIENT)
Dept: CARDIOLOGY | Facility: CLINIC | Age: 71
End: 2021-03-29
Payer: MEDICARE

## 2021-03-29 VITALS — SYSTOLIC BLOOD PRESSURE: 160 MMHG | DIASTOLIC BLOOD PRESSURE: 70 MMHG | HEART RATE: 72 BPM

## 2021-03-29 VITALS — WEIGHT: 166 LBS | HEIGHT: 65 IN | TEMPERATURE: 98.3 F | BODY MASS INDEX: 27.66 KG/M2

## 2021-03-29 PROCEDURE — 99214 OFFICE O/P EST MOD 30 MIN: CPT

## 2021-03-29 PROCEDURE — 99072 ADDL SUPL MATRL&STAF TM PHE: CPT

## 2021-03-29 PROCEDURE — 93000 ELECTROCARDIOGRAM COMPLETE: CPT

## 2021-03-29 RX ORDER — BLOOD SUGAR DIAGNOSTIC
STRIP MISCELLANEOUS
Qty: 200 | Refills: 0 | Status: ACTIVE | COMMUNITY
Start: 2020-10-15

## 2021-03-29 RX ORDER — INSULIN DEGLUDEC INJECTION 200 U/ML
200 INJECTION, SOLUTION SUBCUTANEOUS
Qty: 9 | Refills: 0 | Status: ACTIVE | COMMUNITY
Start: 2021-02-05

## 2021-03-29 RX ORDER — BLOOD-GLUCOSE METER
W/DEVICE EACH MISCELLANEOUS
Qty: 1 | Refills: 0 | Status: ACTIVE | COMMUNITY
Start: 2020-10-12

## 2021-03-29 RX ORDER — LANCETS 33 GAUGE
EACH MISCELLANEOUS
Qty: 200 | Refills: 0 | Status: ACTIVE | COMMUNITY
Start: 2020-10-15

## 2021-03-29 RX ORDER — PEN NEEDLE, DIABETIC 32GX 5/32"
32G X 4 MM NEEDLE, DISPOSABLE MISCELLANEOUS
Qty: 100 | Refills: 0 | Status: ACTIVE | COMMUNITY
Start: 2021-01-04

## 2021-03-29 NOTE — ASSESSMENT
[FreeTextEntry1] : ASHD\par S/P CABG with patent grafts\par Angina\par Hyperlipidemia\par Mild MR\par Mild AI\par Mild TR

## 2021-03-29 NOTE — REASON FOR VISIT
[FreeTextEntry1] : Patient presents for follow up after his cardiac catheterization. he is here to review the results.

## 2021-03-29 NOTE — DISCUSSION/SUMMARY
[FreeTextEntry1] : Patent grafts with severe native vessel disease.\par Patient was instructed to target their T. Cholesterol to less than 200 mg/dl and LDL cholesterol to less than 70 mg/dl.\par Exercise and weight loss was advised.\par Maintain cardiac medications. \par Patient was advised to repeat a BMP, CBC , fasting lipid profile and hepatic panel.\par RV in 10/21\par Patient was advised to call me if his CP were to recur and to go to the ER if it does not subside.\par

## 2021-05-11 NOTE — DISCHARGE NOTE NURSING/CASE MANAGEMENT/SOCIAL WORK - NSDCPEPTCAREGIVEDUMATLIST _GEN_ALL_CORE
Patient doing well. Advised to return to the ED if symptoms return or persist. Osman Watters MD Attending Physician
Diabetes

## 2021-09-18 ENCOUNTER — RX RENEWAL (OUTPATIENT)
Age: 71
End: 2021-09-18

## 2021-11-01 ENCOUNTER — APPOINTMENT (OUTPATIENT)
Dept: CARDIOLOGY | Facility: CLINIC | Age: 71
End: 2021-11-01
Payer: MEDICARE

## 2021-11-01 VITALS
DIASTOLIC BLOOD PRESSURE: 70 MMHG | HEART RATE: 58 BPM | TEMPERATURE: 97.8 F | SYSTOLIC BLOOD PRESSURE: 142 MMHG | WEIGHT: 168 LBS | HEIGHT: 65 IN | BODY MASS INDEX: 27.99 KG/M2

## 2021-11-01 PROCEDURE — 93000 ELECTROCARDIOGRAM COMPLETE: CPT

## 2021-11-01 PROCEDURE — 99214 OFFICE O/P EST MOD 30 MIN: CPT

## 2021-11-01 RX ORDER — NITROGLYCERIN 0.4 MG/1
0.4 TABLET SUBLINGUAL
Refills: 0 | Status: DISCONTINUED | COMMUNITY
End: 2021-11-01

## 2021-11-01 RX ORDER — INSULIN DEGLUDEC INJECTION 100 U/ML
100 INJECTION, SOLUTION SUBCUTANEOUS
Refills: 0 | Status: DISCONTINUED | COMMUNITY
End: 2021-11-01

## 2021-11-01 NOTE — DISCUSSION/SUMMARY
[FreeTextEntry1] : Patent grafts with severe native vessel disease.\par Patient was instructed to target his T. Cholesterol to less than 200 mg/dl and LDL cholesterol to less than 70 mg/dl.\par Exercise and weight loss was advised.\par Maintain cardiac medications. \par Patient was advised to repeat a BMP, CBC , fasting lipid profile and hepatic panel.\par RV in 6 months.\par \par

## 2022-05-24 ENCOUNTER — APPOINTMENT (OUTPATIENT)
Dept: CARDIOLOGY | Facility: CLINIC | Age: 72
End: 2022-05-24

## 2023-02-06 ENCOUNTER — APPOINTMENT (OUTPATIENT)
Dept: CARDIOLOGY | Facility: CLINIC | Age: 73
End: 2023-02-06
Payer: MEDICARE

## 2023-02-06 VITALS
HEIGHT: 65 IN | BODY MASS INDEX: 27.82 KG/M2 | DIASTOLIC BLOOD PRESSURE: 64 MMHG | SYSTOLIC BLOOD PRESSURE: 138 MMHG | WEIGHT: 167 LBS | HEART RATE: 62 BPM

## 2023-02-06 PROCEDURE — 93000 ELECTROCARDIOGRAM COMPLETE: CPT

## 2023-02-06 PROCEDURE — 99214 OFFICE O/P EST MOD 30 MIN: CPT

## 2023-02-06 RX ORDER — ISOPROPYL ALCOHOL 70 ML/100ML
70 SWAB TOPICAL
Qty: 200 | Refills: 0 | Status: ACTIVE | COMMUNITY
Start: 2023-01-26

## 2023-02-06 RX ORDER — DILTIAZEM HYDROCHLORIDE 120 MG/1
120 CAPSULE, EXTENDED RELEASE ORAL
Qty: 90 | Refills: 0 | Status: ACTIVE | COMMUNITY
Start: 2022-08-27

## 2023-02-06 RX ORDER — CALCITRIOL 0.25 UG/1
0.25 CAPSULE, LIQUID FILLED ORAL
Qty: 90 | Refills: 0 | Status: ACTIVE | COMMUNITY
Start: 2022-08-27

## 2023-02-06 RX ORDER — SIMVASTATIN 40 MG/1
40 TABLET, FILM COATED ORAL
Qty: 90 | Refills: 3 | Status: ACTIVE | COMMUNITY
Start: 2023-02-06 | End: 1900-01-01

## 2023-02-06 RX ORDER — FAMOTIDINE 40 MG/1
40 TABLET, FILM COATED ORAL
Qty: 180 | Refills: 0 | Status: ACTIVE | COMMUNITY
Start: 2022-12-07

## 2023-02-06 RX ORDER — INSULIN ASPART 100 [IU]/ML
100 INJECTION, SOLUTION INTRAVENOUS; SUBCUTANEOUS
Qty: 45 | Refills: 0 | Status: ACTIVE | COMMUNITY
Start: 2022-12-05

## 2023-02-06 RX ORDER — NAPROXEN SODIUM 550 MG/1
550 TABLET ORAL
Qty: 10 | Refills: 0 | Status: ACTIVE | COMMUNITY
Start: 2023-01-07

## 2023-02-06 RX ORDER — LISINOPRIL 20 MG/1
20 TABLET ORAL DAILY
Qty: 90 | Refills: 3 | Status: DISCONTINUED | COMMUNITY
Start: 2021-09-18 | End: 2023-02-06

## 2023-02-06 NOTE — DISCUSSION/SUMMARY
[FreeTextEntry1] : Patent grafts with severe native vessel disease.\par Patient was instructed to target his T. Cholesterol to less than 200 mg/dl and LDL cholesterol to less than 70 mg/dl.\par EKG: NSR, rate of 62 bpm.\par Exercise and weight loss was advised.\par Maintain cardiac medications. \par Patient was advised to repeat a BMP, CBC , fasting lipid profile and hepatic panel.\par RV in 6 months.\par \par

## 2023-02-28 ENCOUNTER — EMERGENCY (EMERGENCY)
Facility: HOSPITAL | Age: 73
LOS: 0 days | Discharge: HOME | End: 2023-02-28
Attending: EMERGENCY MEDICINE
Payer: MEDICARE

## 2023-02-28 VITALS
HEART RATE: 70 BPM | DIASTOLIC BLOOD PRESSURE: 77 MMHG | OXYGEN SATURATION: 99 % | RESPIRATION RATE: 18 BRPM | TEMPERATURE: 98 F | SYSTOLIC BLOOD PRESSURE: 165 MMHG

## 2023-02-28 VITALS
RESPIRATION RATE: 16 BRPM | HEART RATE: 66 BPM | TEMPERATURE: 98 F | SYSTOLIC BLOOD PRESSURE: 200 MMHG | DIASTOLIC BLOOD PRESSURE: 83 MMHG | WEIGHT: 164.91 LBS | OXYGEN SATURATION: 100 %

## 2023-02-28 DIAGNOSIS — Z79.85 LONG-TERM (CURRENT) USE OF INJECTABLE NON-INSULIN ANTIDIABETIC DRUGS: ICD-10-CM

## 2023-02-28 DIAGNOSIS — Z79.82 LONG TERM (CURRENT) USE OF ASPIRIN: ICD-10-CM

## 2023-02-28 DIAGNOSIS — I25.10 ATHEROSCLEROTIC HEART DISEASE OF NATIVE CORONARY ARTERY WITHOUT ANGINA PECTORIS: ICD-10-CM

## 2023-02-28 DIAGNOSIS — Z95.1 PRESENCE OF AORTOCORONARY BYPASS GRAFT: ICD-10-CM

## 2023-02-28 DIAGNOSIS — Z90.49 ACQUIRED ABSENCE OF OTHER SPECIFIED PARTS OF DIGESTIVE TRACT: Chronic | ICD-10-CM

## 2023-02-28 DIAGNOSIS — E11.22 TYPE 2 DIABETES MELLITUS WITH DIABETIC CHRONIC KIDNEY DISEASE: ICD-10-CM
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
## 2023-02-28 DIAGNOSIS — K21.9 GASTRO-ESOPHAGEAL REFLUX DISEASE WITHOUT ESOPHAGITIS: ICD-10-CM

## 2023-02-28 DIAGNOSIS — N18.32 CHRONIC KIDNEY DISEASE, STAGE 3B: ICD-10-CM

## 2023-02-28 DIAGNOSIS — M43.8X2 OTHER SPECIFIED DEFORMING DORSOPATHIES, CERVICAL REGION: ICD-10-CM

## 2023-02-28 DIAGNOSIS — Z95.1 PRESENCE OF AORTOCORONARY BYPASS GRAFT: Chronic | ICD-10-CM

## 2023-02-28 DIAGNOSIS — H54.42A3 BLINDNESS LEFT EYE CATEGORY 3, NORMAL VISION RIGHT EYE: ICD-10-CM

## 2023-02-28 DIAGNOSIS — M25.512 PAIN IN LEFT SHOULDER: ICD-10-CM

## 2023-02-28 DIAGNOSIS — S09.90XA UNSPECIFIED INJURY OF HEAD, INITIAL ENCOUNTER: ICD-10-CM

## 2023-02-28 DIAGNOSIS — Z79.4 LONG TERM (CURRENT) USE OF INSULIN: ICD-10-CM

## 2023-02-28 DIAGNOSIS — S30.0XXA CONTUSION OF LOWER BACK AND PELVIS, INITIAL ENCOUNTER: ICD-10-CM
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
## 2023-02-28 DIAGNOSIS — Z98.890 OTHER SPECIFIED POSTPROCEDURAL STATES: Chronic | ICD-10-CM

## 2023-02-28 DIAGNOSIS — S46.912A STRAIN OF UNSPECIFIED MUSCLE, FASCIA AND TENDON AT SHOULDER AND UPPER ARM LEVEL, LEFT ARM, INITIAL ENCOUNTER: ICD-10-CM

## 2023-02-28 DIAGNOSIS — E78.00 PURE HYPERCHOLESTEROLEMIA, UNSPECIFIED: ICD-10-CM

## 2023-02-28 DIAGNOSIS — I12.9 HYPERTENSIVE CHRONIC KIDNEY DISEASE WITH STAGE 1 THROUGH STAGE 4 CHRONIC KIDNEY DISEASE, OR UNSPECIFIED CHRONIC KIDNEY DISEASE: ICD-10-CM

## 2023-02-28 DIAGNOSIS — Z90.49 ACQUIRED ABSENCE OF OTHER SPECIFIED PARTS OF DIGESTIVE TRACT: ICD-10-CM

## 2023-02-28 DIAGNOSIS — R51.9 HEADACHE, UNSPECIFIED: ICD-10-CM
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
## 2023-02-28 PROCEDURE — 73030 X-RAY EXAM OF SHOULDER: CPT | Mod: 26,LT

## 2023-02-28 PROCEDURE — 70450 CT HEAD/BRAIN W/O DYE: CPT | Mod: 26,MA

## 2023-02-28 PROCEDURE — 73502 X-RAY EXAM HIP UNI 2-3 VIEWS: CPT | Mod: 26,RT

## 2023-02-28 PROCEDURE — 72125 CT NECK SPINE W/O DYE: CPT | Mod: 26,MA

## 2023-02-28 PROCEDURE — 70450 CT HEAD/BRAIN W/O DYE: CPT | Mod: MA

## 2023-02-28 PROCEDURE — 99284 EMERGENCY DEPT VISIT MOD MDM: CPT

## 2023-02-28 PROCEDURE — 71046 X-RAY EXAM CHEST 2 VIEWS: CPT

## 2023-02-28 PROCEDURE — 73502 X-RAY EXAM HIP UNI 2-3 VIEWS: CPT | Mod: RT

## 2023-02-28 PROCEDURE — 71046 X-RAY EXAM CHEST 2 VIEWS: CPT | Mod: 26

## 2023-02-28 PROCEDURE — 72125 CT NECK SPINE W/O DYE: CPT | Mod: MA

## 2023-02-28 PROCEDURE — 99284 EMERGENCY DEPT VISIT MOD MDM: CPT | Mod: 25

## 2023-02-28 PROCEDURE — 73030 X-RAY EXAM OF SHOULDER: CPT | Mod: LT

## 2023-02-28 RX ORDER — ACETAMINOPHEN 500 MG
650 TABLET ORAL ONCE
Refills: 0 | Status: COMPLETED | OUTPATIENT
Start: 2023-02-28 | End: 2023-02-28

## 2023-02-28 RX ADMIN — Medication 650 MILLIGRAM(S): at 21:38

## 2023-02-28 RX ADMIN — Medication 650 MILLIGRAM(S): at 21:08

## 2023-02-28 NOTE — ED PROVIDER NOTE - ATTENDING APP SHARED VISIT CONTRIBUTION OF CARE
72-year-old male past medical history of HTN, HLD, DM, triple bypass surgery, CKD, appendectomy, eye surgeries, GERD presents to the ER for slip and fall on ice yesterday, and now having right hip/buttock pain, and posterior head soreness.  Some mild left shoulder discomfort but has been able to move it.. Patient denies use of any anticoagulants, and denied any LOC.  Patient denies any other complaints, no nausea/vomiting/dizziness/shortness of breath/chest pain/back pain/abdominal pain/bruising/open wounds/ complaints.    On exam patient NAD, NCAT, PERRL, EOMI.  Palpated skull with no step-off, no C-spine tenderness, no neck pain.  No T/L/S spine midline tenderness, no flank tenderness, no chest wall tenderness, lungs: CTAB, heart: Regular S1-S2.  Abdomen: Soft, ND/NT, + BS, no mass.  EXT: Mild soreness to the right buttock, no bruising to buttock. pt however is ambulating steadily, no joint deformity, no open wounds, no pedal edema, and distal pulses are intact.  Neuro: Alert and oriented x3 patient motor and sensory intact, ambulating steadily.    A/P elderly male presents to the ED s/p slip and fall yesterday.  Will check x-rays, and CT head and C-spine, and reassess.    ALL: nkda  Meds: tresiba, novolog, simvastatin, famotidine, metoprolol, asa  SH no smoking  PMD Fulop

## 2023-02-28 NOTE — ED PROVIDER NOTE - NSICDXPASTMEDICALHX_GEN_ALL_CORE_FT
PAST MEDICAL HISTORY:  Blindness of left eye with normal vision in contralateral eye     CAD (coronary artery disease)     HTN (hypertension)     Hyperlipidemia     Stage 3b chronic kidney disease     Type 2 diabetes mellitus

## 2023-02-28 NOTE — ED PROVIDER NOTE - OBJECTIVE STATEMENT
72 years old male with history of hypertensions, high cholesterol, diabetes, CAD status post CABG on daily aspirin present complaint of closed head injury.  Patient report he slipped and fell on ice while closing the car door last night (20 hours ago).  He smacked the left side of his head against a car door.  Denies LOC at the time.  Took Advil last night which improved his headache.  Patient went to outside urgent care tonight and was referred to ED for evaluations.  Patient also complains of mild left shoulder and right buttocks pain.  Denies neck and back pain, chest and abdominal pain, weakness and numbness to all extremities.

## 2023-02-28 NOTE — ED PROVIDER NOTE - NS ED ATTENDING STATEMENT MOD
This was a shared visit with the MARGARET. I reviewed and verified the documentation and independently performed the documented:

## 2023-02-28 NOTE — ED ADULT NURSE NOTE - CHIEF COMPLAINT QUOTE
Patient slipped and fell on ice, hit back of head and right hip. Denies dizziness, blurry vision, ringing in ears. [place] [facility] [place] in [place].

## 2023-02-28 NOTE — ED PROVIDER NOTE - NSFOLLOWUPINSTRUCTIONS_ED_ALL_ED_FT
Closed Head Injury    Closed head injury in an injury to your head that may or may not involve a traumatic brain injury (TBI). Symptoms of TBI can be short or long lasting and include headache, dizziness, interference with memory or speech, fatigue, confusion, changes in sleep, mood changes, nausea, depression/anxiety, and dulling of senses. Make sure to obtain proper rest which includes getting plenty of sleep, avoiding excessive visual stimulation, and avoiding activities that may cause physical or mental stress. Avoid any situation where there is potential for another head injury including sports.    SEEK MEDICAL CARE IF YOU HAVE THE FOLLOWING SYMPTOMS: unusual drowsiness, vomiting, severe dizziness, seizures, lightheadedness, muscular weakness, different pupil sizes, visual changes, or clear or bloody discharge from your ears or nose.       Shoulder Sprain/Strain    A shoulder sprain is a partial or complete tear in one of the tough, fiber-like tissues (ligaments) in the shoulder. The ligaments in the shoulder help to hold the shoulder in place.    What are the causes?  This condition may be caused by:    A fall.  A hit to the shoulder.  A twist of the arm.    What increases the risk?  This condition is more likely to develop in:    People who play sports.  People who have problems with balance or coordination.    What are the signs or symptoms?  Symptoms of this condition include:    Pain when moving the shoulder.  Limited ability to move the shoulder.  Swelling and tenderness on top of the shoulder.  Warmth in the shoulder.  A change in the shape of the shoulder.  Redness or bruising on the shoulder.    How is this diagnosed?  This condition is diagnosed with a physical exam. During the exam, you may be asked to do simple exercises with your shoulder. You may also have imaging tests, such as X-rays, MRI, or a CT scan. These tests can show how severe the sprain is.    How is this treated?  This condition may be treated with:    Rest.  Pain medicine.  Ice.  A sling or brace. This is used to keep the arm still while the shoulder is healing.  Physical therapy or rehabilitation exercises. These help to improve the range of motion and strength of the shoulder.  Surgery (rare). Surgery may be needed if the sprain caused a joint to become unstable. Surgery may also be needed to reduce pain.    Some people may develop ongoing shoulder pain or lose some range of motion in the shoulder. However, most people do not develop long-term problems.    Follow these instructions at home:  ImageRest.  Ask your health care provider when it is safe for you to drive if you have a sling or brace on your shoulder.  Take over-the-counter and prescription medicines only as told by your health care provider.  If directed, apply ice to the area:    Put ice in a plastic bag.  Place a towel between your skin and the bag.  Leave the ice on for 20 minutes, 2–3 times per day.    If you were given a shoulder sling or brace:    Wear it as told.  Remove it to shower or bathe.  Move your arm only as much as told by your health care provider, but keep your hand moving to prevent swelling.    If you were shown how to do any exercises, do them as told by your health care provider.  Keep all follow-up visits as told by your health care provider. This is important.  Contact a health care provider if:  Your pain gets worse.  Your pain is not relieved with medicines.  You have increased redness or swelling.  Get help right away if:  You have a fever.  You cannot move your arm or shoulder.  You develop severe numbness or tingling in your arm, hand, or fingers.  Your arm, hand, or fingers turn blue, white, or gray and feel cold.  This information is not intended to replace advice given to you by your health care provider. Make sure you discuss any questions you have with your health care provider.     Contusion    Continues R.I.C.E (Rest, Ice, Compression and Elevation) in the next few days. Apply ice 3-4 times a day and 15 minutes a time.    A contusion is a deep bruise. Contusions are the result of a blunt injury to tissues and muscle fibers under the skin. The skin overlying the contusion may turn blue, purple, or yellow. Symptoms also include pain and swelling in the injured area.    SEEK IMMEDIATE MEDICAL CARE IF YOU HAVE THE FOLLOWING SYMPTOMS: severe pain, numbness, tingling, pain, weakness, or skin color/temperature change in any part of your body distal to the fracture.

## 2023-02-28 NOTE — ED PROVIDER NOTE - CARE PLAN
1 Principal Discharge DX:	Closed head injury  Secondary Diagnosis:	Left shoulder strain  Secondary Diagnosis:	Contusion of buttock

## 2023-02-28 NOTE — ED PROVIDER NOTE - PATIENT PORTAL LINK FT
You can access the FollowMyHealth Patient Portal offered by [facility] by registering at the following website: [email or web address]. By joining [employer]’s FollowMyHealth portal, you will also be able to view your health information using other applications (apps) compatible with our system.

## 2023-02-28 NOTE — ED PROVIDER NOTE - PHYSICAL EXAMINATION
CONSTITUTIONAL: Well-appearing; well-nourished; in no apparent distress.   EYES: EOM intact.  Blind left eye from prior surgery noted.  CARDIOVASCULAR: Normal S1, S2; no murmurs, rubs, or gallops.   RESPIRATORY: Normal chest excursion with respiration; breath sounds clear and equal bilaterally; no wheezes, rhonchi, or rales.  GI/: Normal bowel sounds; non-distended; non-tender; no palpable organomegaly.   MS: No midline bony tenderness to neck and back. neck/ back with FROM without pain.  All extremities without deformity and tenderness. FROM without pain. ambulate steady and guarding gait. sensation and strength equal to b/l upper and lower extremities.  SKIN: No ecchymosis noted over the left shoulder and right buttocks.  NEURO/PSYCH: A & O x 4; grossly unremarkable.  Speaking full sentences.  Moving all extremities.

## 2023-02-28 NOTE — ED ADULT TRIAGE NOTE - CHIEF COMPLAINT QUOTE
Patient slipped and fell on ice, hit back of head and right hip. Denies dizziness, blurry vision, ringing in ears. Sent in for further evaluation [place] [facility] [place] Patient ambulatory in triage.

## 2023-02-28 NOTE — ED PROVIDER NOTE - CLINICAL SUMMARY MEDICAL DECISION MAKING FREE TEXT BOX
72-year-old male past medical history of HTN, HLD, DM, triple bypass surgery, CKD, appendectomy, eye surgeries, GERD presents to the ER for slip and fall on ice yesterday, and now having right hip/buttock pain, and posterior head soreness.  Some mild left shoulder discomfort but has been able to move it.. Patient denies use of any anticoagulants, and denied any LOC.  Patient denies any other complaints, no nausea/vomiting/dizziness/shortness of breath/chest pain/back pain/abdominal pain/bruising/open wounds/ complaints.    XRs and CT scan neg for acute traumatic injury. Recommend OTC tylenol or motrin prn, follow up with PMD. Return to ER for any worsening.

## 2023-04-24 ENCOUNTER — RESULT CHARGE (OUTPATIENT)
Age: 73
End: 2023-04-24

## 2023-04-24 ENCOUNTER — APPOINTMENT (OUTPATIENT)
Dept: UROLOGY | Facility: CLINIC | Age: 73
End: 2023-04-24
Payer: MEDICARE

## 2023-04-24 VITALS
SYSTOLIC BLOOD PRESSURE: 155 MMHG | WEIGHT: 167 LBS | BODY MASS INDEX: 27.82 KG/M2 | HEIGHT: 65 IN | HEART RATE: 61 BPM | DIASTOLIC BLOOD PRESSURE: 64 MMHG

## 2023-04-24 DIAGNOSIS — Z00.00 ENCOUNTER FOR GENERAL ADULT MEDICAL EXAMINATION W/OUT ABNORMAL FINDINGS: ICD-10-CM

## 2023-04-24 LAB
BILIRUB UR QL STRIP: NORMAL
CLARITY UR: NORMAL
COLLECTION METHOD: NORMAL
GLUCOSE UR-MCNC: NORMAL
HCG UR QL: 0.2 EU/DL
HGB UR QL STRIP.AUTO: NORMAL
KETONES UR-MCNC: NORMAL
LEUKOCYTE ESTERASE UR QL STRIP: NORMAL
NITRITE UR QL STRIP: NORMAL
PH UR STRIP: 5.5
PROT UR STRIP-MCNC: 300
SP GR UR STRIP: 1.01

## 2023-04-24 PROCEDURE — 99204 OFFICE O/P NEW MOD 45 MIN: CPT

## 2023-05-03 ENCOUNTER — APPOINTMENT (OUTPATIENT)
Dept: UROLOGY | Facility: CLINIC | Age: 73
End: 2023-05-03
Payer: MEDICARE

## 2023-05-03 PROCEDURE — 52001 CYSTO W/IRRG&EVAC MLT CLOTS: CPT

## 2023-05-03 RX ORDER — CEFUROXIME AXETIL 250 MG/1
250 TABLET ORAL
Qty: 6 | Refills: 0 | Status: ACTIVE | COMMUNITY
Start: 2023-05-03 | End: 1900-01-01

## 2023-05-03 NOTE — ED ADULT TRIAGE NOTE - MODE OF ARRIVAL
HPI:  Pt is a 59 yo male who sustained a left 5th metacarpal base fracture on 3/20/23  He was last seen on 3/23/23 when he was continued with splint immobilization  He notes some aching pain at times  He has been working on finger ROM in the splint as instructed  PE:  LUE:  Able to make a full fist complex, no edema, mild tenderness over ulnar hand, SILT, no deformity    A/P:  Pt is a 59 yo male who sustained a left 5th metacarpal base fracture on 3/20/23   -May discontinue splint   -Increase activity as tolerated   -No restrictions for work as far as the left hand is concerned  He has left shoulder issues that are being treated by another provided  Will defer to that provider for that condition and work status  -May f/u PRN 
EMS

## 2023-05-03 NOTE — HISTORY OF PRESENT ILLNESS
[Urinary Retention] : urinary retention [Urinary Urgency] : urinary urgency [Urinary Frequency] : urinary frequency [Nocturia] : nocturia [Straining] : straining [Weak Stream] : weak stream [Intermittency] : intermittency [Erectile Dysfunction] : Erectile Dysfunction [Hematuria - Gross] : gross hematuria [FreeTextEntry1] : 71 yo with gross hematuria\par self limited\par \par presents having had a recent CT urogram and lab studies (not provided)\par the patient offers no voiding complaints\par \par urine studies\par \par 4/2023\par \par PSA       - 3.6 / 30% free\par HgbA1c - 7.2\par Cr             2.2\par \par UA large blood and protein\par no infection\par \par CT urogram 4/19/2023\par prostate 105 cc\par no hydronephrosis\par minimal high attenuation material within the dependent bladder lumen\par prostate 105 cc \par \par IPSS - 22\par IIEF 15\par \par

## 2023-05-08 ENCOUNTER — NON-APPOINTMENT (OUTPATIENT)
Age: 73
End: 2023-05-08

## 2023-05-11 ENCOUNTER — RX RENEWAL (OUTPATIENT)
Age: 73
End: 2023-05-11

## 2023-05-11 RX ORDER — NITROGLYCERIN 0.4 MG/1
0.4 TABLET SUBLINGUAL
Qty: 75 | Refills: 3 | Status: ACTIVE | COMMUNITY
Start: 2023-02-06 | End: 1900-01-01

## 2023-06-05 ENCOUNTER — APPOINTMENT (OUTPATIENT)
Dept: UROLOGY | Facility: CLINIC | Age: 73
End: 2023-06-05
Payer: MEDICARE

## 2023-06-05 VITALS
WEIGHT: 167 LBS | BODY MASS INDEX: 27.82 KG/M2 | HEIGHT: 65 IN | HEART RATE: 56 BPM | SYSTOLIC BLOOD PRESSURE: 185 MMHG | DIASTOLIC BLOOD PRESSURE: 70 MMHG | TEMPERATURE: 98.4 F

## 2023-06-05 DIAGNOSIS — I77.9 DISORDER OF ARTERIES AND ARTERIOLES, UNSPECIFIED: ICD-10-CM

## 2023-06-05 DIAGNOSIS — N52.1 DISORDER OF ARTERIES AND ARTERIOLES, UNSPECIFIED: ICD-10-CM

## 2023-06-05 PROCEDURE — 99214 OFFICE O/P EST MOD 30 MIN: CPT

## 2023-06-05 NOTE — PHYSICAL EXAM
[General Appearance - Well Developed] : well developed [General Appearance - Well Nourished] : well nourished [Normal Appearance] : normal appearance [Well Groomed] : well groomed [General Appearance - In No Acute Distress] : no acute distress [Abdomen Soft] : soft [Skin Color & Pigmentation] : normal skin color and pigmentation [Skin Turgor] : supple [] : no respiratory distress [Respiration, Rhythm And Depth] : normal respiratory rhythm and effort [Oriented To Time, Place, And Person] : oriented to person, place, and time [Normal Station and Gait] : the gait and station were normal for the patient's age [No Focal Deficits] : no focal deficits

## 2023-06-05 NOTE — HISTORY OF PRESENT ILLNESS
[FreeTextEntry1] : Requesting records be sent to PCP: Dr. Kaitlin Castro [phone]\par \par Patient Sukh Rodriguez is a 71 y/o male who presents to the office today for evaluation of ED; referred by Dr. Jha. \par \par PMH: HTN, HLD, kidney disease\par History of heart attack 2001; triple bypass \par \par \par Patient is requesting prescription for Cialis. He is prescribed nitroglycerin medication.\par This was discussed at his last visit with Dr. Jha. \par PDE 5 inhibitors are absolutely contraindicated due to nitroglycerin prescription.\par \par Here to discuss other options as PDE 5 inhibitor will not be prescribed. \par \par Patient reports unable to achieve an erection for intercourse \par Sex drive is at 50%\par Reports delayed ejaculation as well as pain in right testicle after ejaculation \par \par Tried ICI over 20 years, reports minimal response. \par \par Reports urinary frequency and nocturia d/t BPH. Not on medication\par Enlarged prostate on CT (04/2023) 105 cc\par \par

## 2023-06-05 NOTE — ASSESSMENT
[FreeTextEntry1] : Requesting records be sent to PCP: Dr. Kaitlin Castro [phone]\par \par Patient Sukh Rodriguez is a 73 y/o male who presents to the office today for evaluation of ED; referred by Dr. Jha. \par \par PMH: HTN, HLD, kidney disease\par \par Patient is requesting prescription for Cialis. He is prescribed nitroglycerin medication.\par This was discussed at his last visit with Dr. Jha. \par PDE 5 inhibitors are absolutely contraindicated due to nitroglycerin prescription.\par \par Here to discuss other options as PDE 5 inhibitor will NOT be prescribed. \par \par Patient reports unable to achieve an erection for intercourse \par Sex drive is at 50%\par Reports delayed ejaculation as well as pain in right testicle after ejaculation \par \par Tried ICI over 20 years, reports minimal response. \par \par Reports urinary frequency and nocturia d/t BPH. Not on medication\par Enlarged prostate on CT (04/2023) 105 cc\par \par \par

## 2023-06-19 ENCOUNTER — APPOINTMENT (OUTPATIENT)
Dept: UROLOGY | Facility: CLINIC | Age: 73
End: 2023-06-19
Payer: MEDICARE

## 2023-06-19 VITALS
WEIGHT: 167 LBS | BODY MASS INDEX: 27.82 KG/M2 | HEIGHT: 65 IN | SYSTOLIC BLOOD PRESSURE: 174 MMHG | DIASTOLIC BLOOD PRESSURE: 74 MMHG | HEART RATE: 60 BPM

## 2023-06-19 PROCEDURE — 99214 OFFICE O/P EST MOD 30 MIN: CPT

## 2023-06-24 NOTE — ADDENDUM
[FreeTextEntry1] : Documented by RAJENDRA Mohamud acting as a scribe for Dr. Keila Jha \par \par All medical record entries made by the Scribe were at my, Dr. Jha direction and\par personally dictated by me.  I have reviewed the chart and agree that the record\par accurately reflects my personal performance of the history, physical exam, procedure and imaging.  \par  \par \par

## 2023-06-24 NOTE — HISTORY OF PRESENT ILLNESS
[FreeTextEntry1] : 72-year-old male with history of gross hematuria.  CT urogram in April 19, 2023 revealed 105 cc prostate, no hydronephrosis.  Minimal high attenuation material within the dependent bladder lumen.  Cystoscopy May 2023 revealed no evidence of cancer.  There was an old clot and it was irrigated clear.\par \par Patient does have history of erectile dysfunction and cannot take PDE 5 inhibitors as he is prescribed nitroglycerin medication.\par \par He is currently following up with Dr. Tabares.  Patient was recently started on tamsulosin 0.4 mg daily.  And reports improvement in urinary stream.\par \par Patient currently reports feeling well.  He denies any gross hematuria.  Denies any dysuria.

## 2023-06-24 NOTE — ASSESSMENT
[FreeTextEntry1] : 72-year-old with history of gross hematuria.  Status post hematuria work-up.\par Currently following up with Dr. Tabares for lower urinary tract symptoms and erectile dysfunction.\par \par Patient will continue follow-up with Dr. Tabares.  Uroflow and PVR at next visit with Dr. Tabares.\par \par Follow-up as needed

## 2023-07-13 NOTE — ED PROVIDER NOTE - PROVIDER TOKENS
Pt BIBA for chest pain down left arm. Has had elevated troponin in past. 324 mg aspirin, 1 nitroglycerin, 4 mg zofran, 0.5 mg IV versed for anxiety & 700 mL of fluid.       
PROVIDER:[TOKEN:[[id number]],FOLLOWUP:[1-3 Days]]

## 2023-08-04 ENCOUNTER — APPOINTMENT (OUTPATIENT)
Dept: UROLOGY | Facility: CLINIC | Age: 73
End: 2023-08-04
Payer: MEDICARE

## 2023-08-04 VITALS
DIASTOLIC BLOOD PRESSURE: 69 MMHG | BODY MASS INDEX: 28.32 KG/M2 | WEIGHT: 170 LBS | HEIGHT: 65 IN | SYSTOLIC BLOOD PRESSURE: 189 MMHG | HEART RATE: 57 BPM

## 2023-08-04 DIAGNOSIS — R33.9 RETENTION OF URINE, UNSPECIFIED: ICD-10-CM

## 2023-08-04 DIAGNOSIS — N52.9 MALE ERECTILE DYSFUNCTION, UNSPECIFIED: ICD-10-CM

## 2023-08-04 PROCEDURE — 99214 OFFICE O/P EST MOD 30 MIN: CPT
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
## 2023-08-04 NOTE — ASSESSMENT
[FreeTextEntry1] :  Patient Sukh Rodriguez is a 71 y/o male who presents to the office today for evaluation of ED; referred by Dr. Jha.  PMH: HTN, HLD, kidney disease History of heart attack 2001; triple bypass   Patient is requesting prescription for Cialis. He is prescribed nitroglycerin medication. This was discussed at his last visit with Dr. Jha. PDE 5 inhibitors are absolutely contraindicated due to nitroglycerin prescription.   Here to discuss other options as PDE 5 inhibitor will not be prescribed.   Patient reports unable to achieve an erection for intercourse Sex drive is at 50% Reports delayed ejaculation as well as pain in right testicle after ejaculation Tried ICI over 20 years, reports minimal response.  Reports urinary frequency and nocturia d/t BPH. Not on medication Enlarged prostate on CT (04/2023) 105 cc   August 2023:  Since starting the tamsulosin he believes his urinary flow is stronger and nocturia has improved to 2-3x. Denies any lightheadedness, dizziness, nasal congestion, flushing Peak flowrate 9.4 mL/s; voided volume 157 mL PVR 86 mL  - incomplete bladder emptying  Would like to discuss IPP today PDE5 inhibitors contraindicated as he is prescribed nitroglycerin  plan -- given large prostate recommend that we perform TURP patient agrees and arragments will be made aware of risk of infection and bleeding  once recovered can consider IPP after a couple of months of healing

## 2023-08-07 ENCOUNTER — NON-APPOINTMENT (OUTPATIENT)
Age: 73
End: 2023-08-07

## 2023-08-07 LAB — BACTERIA UR CULT: NORMAL

## 2023-08-21 ENCOUNTER — APPOINTMENT (OUTPATIENT)
Dept: CARDIOLOGY | Facility: CLINIC | Age: 73
End: 2023-08-21

## 2023-09-14 NOTE — PROCEDURE NOTE - NSTIMEOUT_GEN_A_CORE
Patient's first and last name, , procedure, and correct site confirmed prior to the start of procedure.
Detail Level: Zone
Detail Level: Generalized
Detail Level: Detailed

## 2023-10-10 ENCOUNTER — APPOINTMENT (OUTPATIENT)
Dept: UROLOGY | Facility: HOSPITAL | Age: 73
End: 2023-10-10

## 2023-10-23 ENCOUNTER — APPOINTMENT (OUTPATIENT)
Dept: CARDIOLOGY | Facility: CLINIC | Age: 73
End: 2023-10-23
Payer: MEDICARE

## 2023-10-23 VITALS
HEIGHT: 65 IN | DIASTOLIC BLOOD PRESSURE: 80 MMHG | SYSTOLIC BLOOD PRESSURE: 130 MMHG | WEIGHT: 170 LBS | HEART RATE: 70 BPM | BODY MASS INDEX: 28.32 KG/M2

## 2023-10-23 PROCEDURE — 93000 ELECTROCARDIOGRAM COMPLETE: CPT

## 2023-10-23 PROCEDURE — 99214 OFFICE O/P EST MOD 30 MIN: CPT

## 2023-11-06 ENCOUNTER — APPOINTMENT (OUTPATIENT)
Dept: UROLOGY | Facility: CLINIC | Age: 73
End: 2023-11-06
Payer: MEDICARE

## 2023-11-06 VITALS
HEART RATE: 69 BPM | TEMPERATURE: 97.3 F | DIASTOLIC BLOOD PRESSURE: 61 MMHG | WEIGHT: 170 LBS | HEIGHT: 65 IN | SYSTOLIC BLOOD PRESSURE: 142 MMHG | BODY MASS INDEX: 28.32 KG/M2

## 2023-11-06 DIAGNOSIS — N13.8 BENIGN PROSTATIC HYPERPLASIA WITH LOWER URINARY TRACT SYMPMS: ICD-10-CM

## 2023-11-06 DIAGNOSIS — N40.1 BENIGN PROSTATIC HYPERPLASIA WITH LOWER URINARY TRACT SYMPMS: ICD-10-CM

## 2023-11-06 DIAGNOSIS — R31.0 GROSS HEMATURIA: ICD-10-CM

## 2023-11-06 PROCEDURE — 99214 OFFICE O/P EST MOD 30 MIN: CPT

## 2023-11-06 RX ORDER — TAMSULOSIN HYDROCHLORIDE 0.4 MG/1
0.4 CAPSULE ORAL
Qty: 90 | Refills: 3 | Status: ACTIVE | COMMUNITY
Start: 2023-06-05 | End: 1900-01-01

## 2023-11-07 PROBLEM — R31.0 GROSS HEMATURIA: Status: ACTIVE | Noted: 2023-04-24

## 2023-11-07 PROBLEM — N40.1 BENIGN LOCALIZED HYPERPLASIA OF PROSTATE WITH URINARY OBSTRUCTION: Status: ACTIVE | Noted: 2023-04-24

## 2024-03-11 ENCOUNTER — APPOINTMENT (OUTPATIENT)
Dept: CARDIOLOGY | Facility: CLINIC | Age: 74
End: 2024-03-11
Payer: MEDICARE

## 2024-03-11 PROCEDURE — 93306 TTE W/DOPPLER COMPLETE: CPT

## 2024-03-18 ENCOUNTER — APPOINTMENT (OUTPATIENT)
Dept: CARDIOLOGY | Facility: CLINIC | Age: 74
End: 2024-03-18
Payer: MEDICARE

## 2024-03-18 VITALS
HEART RATE: 59 BPM | BODY MASS INDEX: 28.32 KG/M2 | DIASTOLIC BLOOD PRESSURE: 62 MMHG | HEIGHT: 65 IN | WEIGHT: 170 LBS | SYSTOLIC BLOOD PRESSURE: 130 MMHG

## 2024-03-18 DIAGNOSIS — I13.10 HYPERTENSIVE HEART AND CHRONIC KIDNEY DISEASE W/OUT HEART FAILURE, WITH STAGE 1 THROUGH STAGE 4 CHRONIC KIDNEY DISEASE, OR UNSPECIFIED CHRONIC KIDNEY DISEASE: ICD-10-CM

## 2024-03-18 DIAGNOSIS — I35.1 NONRHEUMATIC AORTIC (VALVE) INSUFFICIENCY: ICD-10-CM

## 2024-03-18 DIAGNOSIS — E78.5 HYPERLIPIDEMIA, UNSPECIFIED: ICD-10-CM

## 2024-03-18 DIAGNOSIS — Z95.1 PRESENCE OF AORTOCORONARY BYPASS GRAFT: ICD-10-CM

## 2024-03-18 DIAGNOSIS — I25.10 ATHEROSCLEROTIC HEART DISEASE OF NATIVE CORONARY ARTERY W/OUT ANGINA PECTORIS: ICD-10-CM

## 2024-03-18 PROCEDURE — 93000 ELECTROCARDIOGRAM COMPLETE: CPT

## 2024-03-18 PROCEDURE — 99214 OFFICE O/P EST MOD 30 MIN: CPT

## 2024-03-18 NOTE — PHYSICAL EXAM
[General Appearance - Well Nourished] : well nourished [General Appearance - Well Developed] : well developed [No Deformities] : no deformities [General Appearance - In No Acute Distress] : no acute distress [Normal Conjunctiva] : the conjunctiva exhibited no abnormalities [Normal Oropharynx] : normal oropharynx [Normal Jugular Venous V Waves Present] : normal jugular venous V waves present [Respiration, Rhythm And Depth] : normal respiratory rhythm and effort [Heart Rate And Rhythm] : heart rate and rhythm were normal [Heart Sounds] : normal S1 and S2 [Arterial Pulses Normal] : the arterial pulses were normal [Edema] : no peripheral edema present [FreeTextEntry1] : Grade II/VI systolic murmur [Abdomen Soft] : soft [Abdomen Tenderness] : non-tender [Abnormal Walk] : normal gait [Nail Clubbing] : no clubbing of the fingernails [Cyanosis, Localized] : no localized cyanosis [Skin Turgor] : normal skin turgor [] : no rash [Oriented To Time, Place, And Person] : oriented to person, place, and time [No Venous Stasis] : no venous stasis [Affect] : the affect was normal

## 2024-03-18 NOTE — DISCUSSION/SUMMARY
[FreeTextEntry1] : Patent grafts with severe native vessel disease. Patient was instructed to target his T. Cholesterol to less than 200 mg/dl and LDL cholesterol to less than 70 mg/dl. Start a low fat, low cholesterol diet. EKG: NSR, rate of 59 bpm. Exercise and weight loss was advised. Maintain cardiac medications.  Repeat  TTE results were reviewed with the patient in detail. Patient was advised to repeat a BMP, CBC , fasting lipid profile and hepatic panel. RV in 6 months.   [EKG obtained to assist in diagnosis and management of assessed problem(s)] : EKG obtained to assist in diagnosis and management of assessed problem(s)

## 2024-03-18 NOTE — ASSESSMENT
[FreeTextEntry1] : ASHD  S/P CABG with patent grafts documented on a cardiac catheterization  Angina  Hyperlipidemia  Mild MR  Mild AI  Mild TR.

## 2024-08-20 ENCOUNTER — APPOINTMENT (OUTPATIENT)
Dept: CARDIOLOGY | Facility: CLINIC | Age: 74
End: 2024-08-20
Payer: MEDICARE

## 2024-08-20 VITALS
HEART RATE: 48 BPM | DIASTOLIC BLOOD PRESSURE: 68 MMHG | HEIGHT: 65 IN | WEIGHT: 175 LBS | BODY MASS INDEX: 29.16 KG/M2 | SYSTOLIC BLOOD PRESSURE: 140 MMHG

## 2024-08-20 VITALS
WEIGHT: 175 LBS | HEIGHT: 65 IN | BODY MASS INDEX: 29.16 KG/M2 | SYSTOLIC BLOOD PRESSURE: 120 MMHG | DIASTOLIC BLOOD PRESSURE: 70 MMHG

## 2024-08-20 DIAGNOSIS — E78.5 HYPERLIPIDEMIA, UNSPECIFIED: ICD-10-CM

## 2024-08-20 DIAGNOSIS — Z95.1 PRESENCE OF AORTOCORONARY BYPASS GRAFT: ICD-10-CM

## 2024-08-20 DIAGNOSIS — I35.1 NONRHEUMATIC AORTIC (VALVE) INSUFFICIENCY: ICD-10-CM

## 2024-08-20 DIAGNOSIS — I13.10 HYPERTENSIVE HEART AND CHRONIC KIDNEY DISEASE W/OUT HEART FAILURE, WITH STAGE 1 THROUGH STAGE 4 CHRONIC KIDNEY DISEASE, OR UNSPECIFIED CHRONIC KIDNEY DISEASE: ICD-10-CM

## 2024-08-20 DIAGNOSIS — I25.10 ATHEROSCLEROTIC HEART DISEASE OF NATIVE CORONARY ARTERY W/OUT ANGINA PECTORIS: ICD-10-CM

## 2024-08-20 PROCEDURE — 93000 ELECTROCARDIOGRAM COMPLETE: CPT

## 2024-08-20 PROCEDURE — 99214 OFFICE O/P EST MOD 30 MIN: CPT

## 2024-08-20 NOTE — ASSESSMENT
[FreeTextEntry1] : ASHD  S/P CABG with patent grafts documented on a cardiac catheterization  Angina  Hyperlipidemia  Mild MR  Mild AI  Mild TR.  CKD  DM

## 2024-08-20 NOTE — REASON FOR VISIT
[FreeTextEntry1] : Patient presents for Cardiology follow up and review of his recent lab results. He has CKD.

## 2024-08-20 NOTE — DISCUSSION/SUMMARY
[FreeTextEntry1] : Patent grafts with severe native vessel disease. Patient was instructed to target his T. Cholesterol to less than 200 mg/dl and LDL cholesterol to less than 70 mg/dl. Start a low fat, low cholesterol diet. EKG: NSR, rate of 64 bpm. Exercise and weight loss was advised. Maintain cardiac medications.  Repeat TTE results were reviewed with the patient in detail on his prior office visit. Patient was advised to repeat a BMP, CBC , fasting lipid profile and hepatic panel. RV in 3 months.   [EKG obtained to assist in diagnosis and management of assessed problem(s)] : EKG obtained to assist in diagnosis and management of assessed problem(s)

## 2024-10-21 ENCOUNTER — APPOINTMENT (OUTPATIENT)
Dept: CARDIOLOGY | Facility: CLINIC | Age: 74
End: 2024-10-21
Payer: MEDICARE

## 2024-10-21 VITALS
BODY MASS INDEX: 28.82 KG/M2 | HEIGHT: 65 IN | WEIGHT: 173 LBS | DIASTOLIC BLOOD PRESSURE: 58 MMHG | HEART RATE: 59 BPM | SYSTOLIC BLOOD PRESSURE: 130 MMHG

## 2024-10-21 DIAGNOSIS — Z95.1 PRESENCE OF AORTOCORONARY BYPASS GRAFT: ICD-10-CM

## 2024-10-21 DIAGNOSIS — E78.5 HYPERLIPIDEMIA, UNSPECIFIED: ICD-10-CM

## 2024-10-21 DIAGNOSIS — I13.10 HYPERTENSIVE HEART AND CHRONIC KIDNEY DISEASE W/OUT HEART FAILURE, WITH STAGE 1 THROUGH STAGE 4 CHRONIC KIDNEY DISEASE, OR UNSPECIFIED CHRONIC KIDNEY DISEASE: ICD-10-CM

## 2024-10-21 DIAGNOSIS — I35.1 NONRHEUMATIC AORTIC (VALVE) INSUFFICIENCY: ICD-10-CM

## 2024-10-21 DIAGNOSIS — I25.10 ATHEROSCLEROTIC HEART DISEASE OF NATIVE CORONARY ARTERY W/OUT ANGINA PECTORIS: ICD-10-CM

## 2024-10-21 PROCEDURE — 99214 OFFICE O/P EST MOD 30 MIN: CPT

## 2024-10-21 PROCEDURE — 93000 ELECTROCARDIOGRAM COMPLETE: CPT

## 2024-11-06 ENCOUNTER — APPOINTMENT (OUTPATIENT)
Dept: UROLOGY | Facility: CLINIC | Age: 74
End: 2024-11-06
Payer: MEDICARE

## 2024-11-06 ENCOUNTER — LABORATORY RESULT (OUTPATIENT)
Age: 74
End: 2024-11-06

## 2024-11-06 VITALS
RESPIRATION RATE: 16 BRPM | DIASTOLIC BLOOD PRESSURE: 75 MMHG | TEMPERATURE: 97.6 F | OXYGEN SATURATION: 95 % | BODY MASS INDEX: 28.82 KG/M2 | WEIGHT: 173 LBS | HEART RATE: 68 BPM | HEIGHT: 65 IN | SYSTOLIC BLOOD PRESSURE: 183 MMHG

## 2024-11-06 DIAGNOSIS — R82.90 UNSPECIFIED ABNORMAL FINDINGS IN URINE: ICD-10-CM

## 2024-11-06 DIAGNOSIS — N40.1 BENIGN PROSTATIC HYPERPLASIA WITH LOWER URINARY TRACT SYMPMS: ICD-10-CM

## 2024-11-06 DIAGNOSIS — N13.8 BENIGN PROSTATIC HYPERPLASIA WITH LOWER URINARY TRACT SYMPMS: ICD-10-CM

## 2024-11-06 DIAGNOSIS — R31.0 GROSS HEMATURIA: ICD-10-CM

## 2024-11-06 PROCEDURE — 99214 OFFICE O/P EST MOD 30 MIN: CPT

## 2024-11-06 PROCEDURE — 81003 URINALYSIS AUTO W/O SCOPE: CPT | Mod: QW

## 2024-11-11 LAB
BACTERIA UR CULT: NORMAL
BILIRUB UR QL STRIP: NORMAL
COLLECTION METHOD: NORMAL
GLUCOSE UR-MCNC: NORMAL
HCG UR QL: 0.2 EU/DL
HGB UR QL STRIP.AUTO: NORMAL
KETONES UR-MCNC: NORMAL
LEUKOCYTE ESTERASE UR QL STRIP: NORMAL
NITRITE UR QL STRIP: NORMAL
PH UR STRIP: 5
PROT UR STRIP-MCNC: 100
PSA FREE FLD-MCNC: 80 %
PSA FREE SERPL-MCNC: 2.94 NG/ML
PSA SERPL-MCNC: 3.67 NG/ML
SP GR UR STRIP: 1.01

## 2024-12-30 ENCOUNTER — APPOINTMENT (OUTPATIENT)
Dept: UROLOGY | Facility: CLINIC | Age: 74
End: 2024-12-30

## 2025-02-04 NOTE — ED PROVIDER NOTE - SEPSIS ALERT DATE/TIME
Please see the attached refill request. Sent pt message regarding scheduling annual   24-Dec-2019 23:45

## 2025-02-24 ENCOUNTER — NON-APPOINTMENT (OUTPATIENT)
Age: 75
End: 2025-02-24

## 2025-02-24 ENCOUNTER — APPOINTMENT (OUTPATIENT)
Dept: CARDIOLOGY | Facility: CLINIC | Age: 75
End: 2025-02-24
Payer: MEDICARE

## 2025-02-24 VITALS
DIASTOLIC BLOOD PRESSURE: 68 MMHG | HEIGHT: 65 IN | SYSTOLIC BLOOD PRESSURE: 138 MMHG | HEART RATE: 57 BPM | BODY MASS INDEX: 28.16 KG/M2 | WEIGHT: 169 LBS

## 2025-02-24 DIAGNOSIS — I35.1 NONRHEUMATIC AORTIC (VALVE) INSUFFICIENCY: ICD-10-CM

## 2025-02-24 DIAGNOSIS — Z95.1 PRESENCE OF AORTOCORONARY BYPASS GRAFT: ICD-10-CM

## 2025-02-24 DIAGNOSIS — I13.10 HYPERTENSIVE HEART AND CHRONIC KIDNEY DISEASE W/OUT HEART FAILURE, WITH STAGE 1 THROUGH STAGE 4 CHRONIC KIDNEY DISEASE, OR UNSPECIFIED CHRONIC KIDNEY DISEASE: ICD-10-CM

## 2025-02-24 DIAGNOSIS — E78.5 HYPERLIPIDEMIA, UNSPECIFIED: ICD-10-CM

## 2025-02-24 DIAGNOSIS — I25.10 ATHEROSCLEROTIC HEART DISEASE OF NATIVE CORONARY ARTERY W/OUT ANGINA PECTORIS: ICD-10-CM

## 2025-02-24 PROCEDURE — 93000 ELECTROCARDIOGRAM COMPLETE: CPT

## 2025-02-24 PROCEDURE — 99214 OFFICE O/P EST MOD 30 MIN: CPT

## 2025-05-02 ENCOUNTER — EMERGENCY (EMERGENCY)
Facility: HOSPITAL | Age: 75
LOS: 0 days | Discharge: HOME | End: 2025-05-02
Attending: EMERGENCY MEDICINE
Payer: MEDICARE

## 2025-05-02 VITALS
TEMPERATURE: 98 F | SYSTOLIC BLOOD PRESSURE: 196 MMHG | HEART RATE: 74 BPM | RESPIRATION RATE: 16 BRPM | OXYGEN SATURATION: 98 % | DIASTOLIC BLOOD PRESSURE: 76 MMHG

## 2025-05-02 DIAGNOSIS — Z95.5 PRESENCE OF CORONARY ANGIOPLASTY IMPLANT AND GRAFT: ICD-10-CM

## 2025-05-02 DIAGNOSIS — Z95.1 PRESENCE OF AORTOCORONARY BYPASS GRAFT: Chronic | ICD-10-CM

## 2025-05-02 DIAGNOSIS — Z79.82 LONG TERM (CURRENT) USE OF ASPIRIN: ICD-10-CM

## 2025-05-02 DIAGNOSIS — I10 ESSENTIAL (PRIMARY) HYPERTENSION: ICD-10-CM

## 2025-05-02 DIAGNOSIS — E11.9 TYPE 2 DIABETES MELLITUS WITHOUT COMPLICATIONS: ICD-10-CM

## 2025-05-02 DIAGNOSIS — K21.9 GASTRO-ESOPHAGEAL REFLUX DISEASE WITHOUT ESOPHAGITIS: ICD-10-CM

## 2025-05-02 DIAGNOSIS — S46.011A STRAIN OF MUSCLE(S) AND TENDON(S) OF THE ROTATOR CUFF OF RIGHT SHOULDER, INITIAL ENCOUNTER: ICD-10-CM
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
## 2025-05-02 DIAGNOSIS — Z90.49 ACQUIRED ABSENCE OF OTHER SPECIFIED PARTS OF DIGESTIVE TRACT: Chronic | ICD-10-CM

## 2025-05-02 DIAGNOSIS — M25.511 PAIN IN RIGHT SHOULDER: ICD-10-CM

## 2025-05-02 DIAGNOSIS — Z95.1 PRESENCE OF AORTOCORONARY BYPASS GRAFT: ICD-10-CM

## 2025-05-02 DIAGNOSIS — E78.5 HYPERLIPIDEMIA, UNSPECIFIED: ICD-10-CM
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
## 2025-05-02 DIAGNOSIS — Z98.890 OTHER SPECIFIED POSTPROCEDURAL STATES: Chronic | ICD-10-CM

## 2025-05-02 DIAGNOSIS — I25.10 ATHEROSCLEROTIC HEART DISEASE OF NATIVE CORONARY ARTERY WITHOUT ANGINA PECTORIS: ICD-10-CM

## 2025-05-02 LAB — GLUCOSE BLDC GLUCOMTR-MCNC: 151 MG/DL — HIGH (ref 70–99)

## 2025-05-02 PROCEDURE — 71046 X-RAY EXAM CHEST 2 VIEWS: CPT | Mod: 26

## 2025-05-02 PROCEDURE — 70450 CT HEAD/BRAIN W/O DYE: CPT

## 2025-05-02 PROCEDURE — 73030 X-RAY EXAM OF SHOULDER: CPT | Mod: RT

## 2025-05-02 PROCEDURE — 36000 PLACE NEEDLE IN VEIN: CPT

## 2025-05-02 PROCEDURE — 99285 EMERGENCY DEPT VISIT HI MDM: CPT | Mod: 25

## 2025-05-02 PROCEDURE — 71046 X-RAY EXAM CHEST 2 VIEWS: CPT

## 2025-05-02 PROCEDURE — 73030 X-RAY EXAM OF SHOULDER: CPT | Mod: 26,RT

## 2025-05-02 PROCEDURE — 70450 CT HEAD/BRAIN W/O DYE: CPT | Mod: 26

## 2025-05-02 PROCEDURE — 72170 X-RAY EXAM OF PELVIS: CPT | Mod: 26

## 2025-05-02 PROCEDURE — 72170 X-RAY EXAM OF PELVIS: CPT

## 2025-05-02 PROCEDURE — 99285 EMERGENCY DEPT VISIT HI MDM: CPT

## 2025-05-02 PROCEDURE — 93010 ELECTROCARDIOGRAM REPORT: CPT

## 2025-05-02 PROCEDURE — 93005 ELECTROCARDIOGRAM TRACING: CPT

## 2025-05-02 PROCEDURE — 82962 GLUCOSE BLOOD TEST: CPT

## 2025-05-02 NOTE — ED PROVIDER NOTE - PATIENT PORTAL LINK FT
You can access the FollowMyHealth Patient Portal offered by [facility] by registering at the following website: [email or web address]. By joining [facility]’s FollowMyHealth portal, you will also be able to view your health information using other applications (apps) compatible with our system.
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
## 2025-08-18 ENCOUNTER — APPOINTMENT (OUTPATIENT)
Dept: CARDIOLOGY | Facility: CLINIC | Age: 75
End: 2025-08-18